# Patient Record
Sex: MALE | Employment: FULL TIME | URBAN - METROPOLITAN AREA
[De-identification: names, ages, dates, MRNs, and addresses within clinical notes are randomized per-mention and may not be internally consistent; named-entity substitution may affect disease eponyms.]

---

## 2017-05-20 ENCOUNTER — HOSPITAL ENCOUNTER (EMERGENCY)
Facility: CLINIC | Age: 52
Discharge: HOME | End: 2017-05-20
Attending: FAMILY MEDICINE | Admitting: FAMILY MEDICINE
Payer: COMMERCIAL

## 2017-05-20 VITALS
DIASTOLIC BLOOD PRESSURE: 84 MMHG | TEMPERATURE: 97.6 F | RESPIRATION RATE: 17 BRPM | BODY MASS INDEX: 25.38 KG/M2 | WEIGHT: 182 LBS | SYSTOLIC BLOOD PRESSURE: 156 MMHG | OXYGEN SATURATION: 97 %

## 2017-05-20 DIAGNOSIS — S61.219A LACERATION OF FINGER, INITIAL ENCOUNTER: ICD-10-CM

## 2017-05-20 PROCEDURE — 99213 OFFICE O/P EST LOW 20 MIN: CPT | Mod: 25

## 2017-05-20 PROCEDURE — 12002 RPR S/N/AX/GEN/TRNK2.6-7.5CM: CPT

## 2017-05-20 PROCEDURE — 12002 RPR S/N/AX/GEN/TRNK2.6-7.5CM: CPT | Performed by: FAMILY MEDICINE

## 2017-05-20 PROCEDURE — 99213 OFFICE O/P EST LOW 20 MIN: CPT | Mod: 25 | Performed by: FAMILY MEDICINE

## 2017-05-20 RX ORDER — LIDOCAINE HYDROCHLORIDE 10 MG/ML
INJECTION, SOLUTION INFILTRATION; PERINEURAL
Status: DISCONTINUED
Start: 2017-05-20 | End: 2017-05-20 | Stop reason: HOSPADM

## 2017-05-20 RX ORDER — CEPHALEXIN 500 MG/1
500 CAPSULE ORAL 2 TIMES DAILY
Qty: 14 CAPSULE | Refills: 0 | Status: SHIPPED | OUTPATIENT
Start: 2017-05-20 | End: 2017-05-27

## 2017-05-20 NOTE — ED PROVIDER NOTES
History     Chief Complaint   Patient presents with     Laceration     left hand 4th finger      HPI  Darrel Rabago is a 51 year old male who presents to urgent care with a left finger laceration that occurred this morning.   States that he was trying to open some aluminium cans when he accidentally cut his left ring finger.   Attempted to stop the bleeding at home with pressure and gauze and came in for further evaluation.   Bleeding had reduced at time of arrival. No pain reported.   Tetanus immunization is up to date.   Patient is right hand dominant.     I have reviewed the Medications, Allergies, Past Medical and Surgical History, and Social History in the Epic system.    Review of Systems   ROS: 10 point ROS neg other than the symptoms noted above in the HPI.    Physical Exam   BP: 156/84  Heart Rate: 49  Temp: 97.6  F (36.4  C)  Resp: 17  Weight: 82.6 kg (182 lb)  SpO2: 97 %  Physical Exam   Constitutional: He is oriented to person, place, and time. He appears well-developed and well-nourished.   Musculoskeletal: Normal range of motion. He exhibits no edema, tenderness or deformity.   Neurological: He is alert and oriented to person, place, and time.   Skin: Skin is warm.   Left fourth finger laceration measuring about 5 cm to include the finger tip and along the lateral aspect of the nail. No nail bed laceration.        ED Course     ED Course     Laceration repair  Date/Time: 5/20/2017 4:26 PM  Performed by: REYES RIDDLE  Authorized by: REYES RIDDLE   Consent: Verbal consent obtained.  Consent given by: patient  Patient understanding: patient states understanding of the procedure being performed  Patient consent: the patient's understanding of the procedure matches consent given  Patient identity confirmed: verbally with patient  Laceration length: 5 cm  Foreign bodies: no foreign bodies  Tendon involvement: none  Nerve involvement: none  Vascular damage: no    Anesthesia:  Local Anesthetic:  lidocaine 1% without epinephrine   Sedation:  Patient sedated: no    Irrigation solution: tap water  Irrigation method: tap  Amount of cleaning: standard  Debridement: none  Degree of undermining: none  Skin closure: 5-0 nylon and Steri-Strips  Number of sutures: 4  Technique: simple  Approximation: close  Approximation difficulty: simple  Dressing: antibiotic ointment  Patient tolerance: Patient tolerated the procedure well with no immediate complications                   Critical Care time:  none            Labs Ordered and Resulted from Time of ED Arrival Up to the Time of Departure from the ED - No data to display    Assessments & Plan (with Medical Decision Making)     I have reviewed the nursing notes.    I have reviewed the findings, diagnosis, plan and need for follow up with the patient.  51 year old male with Left fourth finger laceration.   Laceration repair, see procedure note above for details. Patient tolerated procedure well.    7 day course of antibiotics given. See prescription details below. Suture removal in 7 days.  Instructed to monitor for any signs of infection.   Patient education and Handout with home care instructions given     The patient was in agreement with the plan today and had no questions or concerns prior to leaving the Urgent care clinic.          Discharge Medication List as of 5/20/2017  4:13 PM      START taking these medications    Details   cephALEXin (KEFLEX) 500 MG capsule Take 1 capsule (500 mg) by mouth 2 times daily for 7 days, Disp-14 capsule, R-0, E-Prescribe             Final diagnoses:   Laceration of finger, initial encounter       5/20/2017   [facility] EMERGENCY DEPARTMENT     Mini Emery MD  05/20/17 3438

## 2017-05-20 NOTE — ED AVS SNAPSHOT
[facility] Emergency Department    [address]    [facility] [address]    Phone:  [phone]    Fax:  [phone]                                       Darrel Rabago   MRN: [id number]    Department:  [facility] Emergency Department   Date of Visit:  5/20/2017           Patient Information     Date Of Birth          1965        Your diagnoses for this visit were:     Laceration of finger, initial encounter        You were seen by Mini Emery MD.      Follow-up Information     Follow up with Wilber Dupree MD In 7 days.    Specialty:  Family Practice    Why:  For suture removal, As needed    Contact information:    [facility]  [address]  [facility] [address]  [phone]          Discharge Instructions         Extremity Laceration: Sutures, Staples, or Tape  A laceration is a cut through the skin. If it is deep, it may require stitches (sutures) or staples to close so it can heal. Minor cuts may be treated with surgical tape closures.   X-rays may be done if something may have entered the skin through the cut. You may also need a tetanus shot if you are not up to date on this vaccination.  Home care    Follow the health care provider s instructions on how to care for the cut.    Wash your hands with soap and warm water before and after caring for your wound. This is to help prevent infection.    Keep the wound clean and dry. If a bandage was applied and it becomes wet or dirty, replace it. Otherwise, leave it in place for the first 24 hours, then change it once a day or as directed.    If sutures or staples were used, clean the wound daily:    After removing the bandage, wash the area with soap and water. Use a wet cotton swab to loosen and remove any blood or crust that forms.    After cleaning, keep the wound clean and dry. Talk with your doctor before applying any antibiotic ointment to the wound. Reapply the bandage.    You may remove the bandage to shower as  usual after the first 24 hours, but do not soak the area in water (no swimming) until the stitches or staples are removed.    If surgical tape closures were used, keep the area clean and dry. If it becomes wet, blot it dry with a towel.    The doctor may prescribe an antibiotic cream or ointment to prevent infection. Do not stop taking this medication until you have finished the prescribed course or the doctor tells you to stop. The doctor may also prescribe medications for pain. Follow the doctor s instructions for taking these medications.    Avoid activities that may reopen your wound.  Follow-up care  Follow up with your health care provider. Most skin wounds heal within ten days. However, an infection may sometimes occur despite proper treatment. Therefore, check the wound daily for the signs of infection listed below. Stitches and staples should be removed within 7-14 days. If surgical tape closures were used, you may remove them after 10 days if they have not fallen off by then.   When to seek medical advice  Call your health care provider right away if any of these occur:    Wound bleeding not controlled by direct pressure    Signs of infection, including increasing pain in the wound, increasing wound redness or swelling, or pus or bad odor coming from the wound    Fever of 100.4 F (38 C) or higher or as directed by your healthcare provider    Stitches or staples come apart or fall out or surgical tape falls off before 7 days    Wound edges re-open    Wound changes colors    Numbness around the wound     Decreased movement around the injured area    1631-5250 [employer]. [address] [place] [address]. All rights reserved. This information is not intended as a substitute for professional medical care. Always follow your healthcare professional's instructions.          24 Hour Appointment Hotline       To make an appointment at any [facility], call [phone]  ([phone]). If you don't have a family doctor or clinic, we will help you find one. [facility] clinics are conveniently located to serve the needs of you and your family.             Review of your medicines      START taking        Dose / Directions Last dose taken    cephALEXin 500 MG capsule   Commonly known as:  KEFLEX   Dose:  500 mg   Quantity:  14 capsule        Take 1 capsule (500 mg) by mouth 2 times daily for 7 days   Refills:  0          Our records show that you are taking the medicines listed below. If these are incorrect, please call your family doctor or clinic.        Dose / Directions Last dose taken    NO ACTIVE MEDICATIONS        .   Refills:  0                Prescriptions were sent or printed at these locations (1 Prescription)                   [facility] [address]   [address] [facility] [address]    Telephone:  [phone]   Fax:  [phone]   Hours:                  E-Prescribed (1 of 1)         cephALEXin (KEFLEX) 500 MG capsule                Orders Needing Specimen Collection     None      Pending Results     No orders found from 5/18/2017 to 5/21/2017.            Pending Culture Results     No orders found from 5/18/2017 to 5/21/2017.            Pending Results Instructions     If you had any lab results that were not finalized at the time of your Discharge, you can call the ED Lab Result RN at [phone]. You will be contacted by this team for any positive Lab results or changes in treatment. The nurses are available 7 days a week from 10A to 6:30P.  You can leave a message 24 hours per day and they will return your call.        Test Results From Your Hospital Stay               Thank you for choosing [facility]       Thank you for choosing [facility] for your care. Our goal is always to provide you with excellent care. Hearing back from our patients is one way we can continue to improve our services. Please take a few minutes to  "complete the written survey that you may receive in the mail after you visit with us. Thank you!        [employer] Information     [employer] lets you send messages to your doctor, view your test results, renew your prescriptions, schedule appointments and more. To sign up, go to [email or web address] . Click on \"Log in\" on the left side of the screen, which will take you to the Welcome page. Then click on \"Sign up Now\" on the right side of the page.     You will be asked to enter the access code listed below, as well as some personal information. Please follow the directions to create your username and password.     Your access code is: [email or web address]  Expires: 2017  4:13 PM     Your access code will  in 90 days. If you need help or a new code, please call your [facility] clinic or [phone].        Care EveryWhere ID     This is your Care EveryWhere ID. This could be used by other organizations to access your [facility] medical records  [id number]        After Visit Summary       This is your record. Keep this with you and show to your community pharmacist(s) and doctor(s) at your next visit.                  "

## 2017-05-20 NOTE — DISCHARGE INSTRUCTIONS
Extremity Laceration: Sutures, Staples, or Tape  A laceration is a cut through the skin. If it is deep, it may require stitches (sutures) or staples to close so it can heal. Minor cuts may be treated with surgical tape closures.   X-rays may be done if something may have entered the skin through the cut. You may also need a tetanus shot if you are not up to date on this vaccination.  Home care    Follow the health care provider s instructions on how to care for the cut.    Wash your hands with soap and warm water before and after caring for your wound. This is to help prevent infection.    Keep the wound clean and dry. If a bandage was applied and it becomes wet or dirty, replace it. Otherwise, leave it in place for the first 24 hours, then change it once a day or as directed.    If sutures or staples were used, clean the wound daily:    After removing the bandage, wash the area with soap and water. Use a wet cotton swab to loosen and remove any blood or crust that forms.    After cleaning, keep the wound clean and dry. Talk with your doctor before applying any antibiotic ointment to the wound. Reapply the bandage.    You may remove the bandage to shower as usual after the first 24 hours, but do not soak the area in water (no swimming) until the stitches or staples are removed.    If surgical tape closures were used, keep the area clean and dry. If it becomes wet, blot it dry with a towel.    The doctor may prescribe an antibiotic cream or ointment to prevent infection. Do not stop taking this medication until you have finished the prescribed course or the doctor tells you to stop. The doctor may also prescribe medications for pain. Follow the doctor s instructions for taking these medications.    Avoid activities that may reopen your wound.  Follow-up care  Follow up with your health care provider. Most skin wounds heal within ten days. However, an infection may sometimes occur despite proper treatment.  Therefore, check the wound daily for the signs of infection listed below. Stitches and staples should be removed within 7-14 days. If surgical tape closures were used, you may remove them after 10 days if they have not fallen off by then.   When to seek medical advice  Call your health care provider right away if any of these occur:    Wound bleeding not controlled by direct pressure    Signs of infection, including increasing pain in the wound, increasing wound redness or swelling, or pus or bad odor coming from the wound    Fever of 100.4 F (38 C) or higher or as directed by your healthcare provider    Stitches or staples come apart or fall out or surgical tape falls off before 7 days    Wound edges re-open    Wound changes colors    Numbness around the wound     Decreased movement around the injured area    8571-9256 [employer]. [address] [place] [address], [place] [address]. All rights reserved. This information is not intended as a substitute for professional medical care. Always follow your healthcare professional's instructions.

## 2017-05-20 NOTE — ED AVS SNAPSHOT
[facility] Emergency Department    [address] [facility] [address]    Phone:  [phone]    Fax:  [phone]                                       Darrel Rabago   MRN: [id number]    Department:  [facility] Emergency Department   Date of Visit:  5/20/2017           After Visit Summary Signature Page     I have received my discharge instructions, and my questions have been answered. I have discussed any challenges I see with this plan with the nurse or doctor.    ..........................................................................................................................................  Patient/Patient Representative Signature      ..........................................................................................................................................  Patient Representative Print Name and Relationship to Patient    ..................................................               ................................................  Date                                            Time    ..........................................................................................................................................  Reviewed by Signature/Title    ...................................................              ..............................................  Date                                                            Time

## 2017-05-20 NOTE — ED NOTES
Pt reports cutting his left 4th finger on some aluminum this morning. Bleeding is controlled upon arrival, it is dressed with bulky gauze. Tetanus is up to date. Pt reports no pain currently.

## 2018-10-02 ENCOUNTER — APPOINTMENT (OUTPATIENT)
Dept: GENERAL RADIOLOGY | Facility: CLINIC | Age: 53
End: 2018-10-02
Attending: FAMILY MEDICINE
Payer: COMMERCIAL

## 2018-10-02 ENCOUNTER — HOSPITAL ENCOUNTER (EMERGENCY)
Facility: CLINIC | Age: 53
Discharge: HOME | End: 2018-10-02
Attending: FAMILY MEDICINE | Admitting: FAMILY MEDICINE
Payer: COMMERCIAL

## 2018-10-02 VITALS
HEART RATE: 66 BPM | BODY MASS INDEX: 26.78 KG/M2 | DIASTOLIC BLOOD PRESSURE: 89 MMHG | WEIGHT: 192 LBS | RESPIRATION RATE: 16 BRPM | SYSTOLIC BLOOD PRESSURE: 150 MMHG | OXYGEN SATURATION: 97 % | TEMPERATURE: 97.8 F

## 2018-10-02 DIAGNOSIS — M25.552 HIP PAIN, LEFT: ICD-10-CM

## 2018-10-02 DIAGNOSIS — M54.42 ACUTE LEFT-SIDED LOW BACK PAIN WITH LEFT-SIDED SCIATICA: ICD-10-CM

## 2018-10-02 PROCEDURE — 96374 THER/PROPH/DIAG INJ IV PUSH: CPT | Performed by: FAMILY MEDICINE

## 2018-10-02 PROCEDURE — 72100 X-RAY EXAM L-S SPINE 2/3 VWS: CPT

## 2018-10-02 PROCEDURE — 96375 TX/PRO/DX INJ NEW DRUG ADDON: CPT | Performed by: FAMILY MEDICINE

## 2018-10-02 PROCEDURE — 99285 EMERGENCY DEPT VISIT HI MDM: CPT | Mod: 25 | Performed by: FAMILY MEDICINE

## 2018-10-02 PROCEDURE — 99285 EMERGENCY DEPT VISIT HI MDM: CPT | Mod: Z6 | Performed by: FAMILY MEDICINE

## 2018-10-02 PROCEDURE — 73502 X-RAY EXAM HIP UNI 2-3 VIEWS: CPT

## 2018-10-02 PROCEDURE — 96361 HYDRATE IV INFUSION ADD-ON: CPT | Performed by: FAMILY MEDICINE

## 2018-10-02 PROCEDURE — 25000128 H RX IP 250 OP 636: Performed by: FAMILY MEDICINE

## 2018-10-02 RX ORDER — HYDROMORPHONE HYDROCHLORIDE 1 MG/ML
0.5 INJECTION, SOLUTION INTRAMUSCULAR; INTRAVENOUS; SUBCUTANEOUS
Status: DISCONTINUED | OUTPATIENT
Start: 2018-10-02 | End: 2018-10-02 | Stop reason: HOSPADM

## 2018-10-02 RX ORDER — DICLOFENAC SODIUM 75 MG/1
75 TABLET, DELAYED RELEASE ORAL 2 TIMES DAILY PRN
Qty: 30 TABLET | Refills: 0 | Status: SHIPPED | OUTPATIENT
Start: 2018-10-02 | End: 2019-06-06

## 2018-10-02 RX ORDER — DIAZEPAM 5 MG
5 TABLET ORAL EVERY 6 HOURS PRN
Qty: 20 TABLET | Refills: 0 | Status: SHIPPED | OUTPATIENT
Start: 2018-10-02 | End: 2018-10-09

## 2018-10-02 RX ORDER — OXYCODONE AND ACETAMINOPHEN 5; 325 MG/1; MG/1
1-2 TABLET ORAL EVERY 4 HOURS PRN
Qty: 12 TABLET | Refills: 0 | Status: SHIPPED | OUTPATIENT
Start: 2018-10-02 | End: 2018-10-10

## 2018-10-02 RX ORDER — KETOROLAC TROMETHAMINE 30 MG/ML
30 INJECTION, SOLUTION INTRAMUSCULAR; INTRAVENOUS ONCE
Status: COMPLETED | OUTPATIENT
Start: 2018-10-02 | End: 2018-10-02

## 2018-10-02 RX ADMIN — KETOROLAC TROMETHAMINE 30 MG: 30 INJECTION, SOLUTION INTRAMUSCULAR at 05:59

## 2018-10-02 RX ADMIN — SODIUM CHLORIDE 1000 ML: 9 INJECTION, SOLUTION INTRAVENOUS at 06:03

## 2018-10-02 RX ADMIN — Medication 0.5 MG: at 06:01

## 2018-10-02 NOTE — ED AVS SNAPSHOT
[facility] Emergency Department    [address] [address]    Phone:  [phone]    Fax:  [phone]                                       Darrel Rabago   MRN: [id number]    Department:  [facility] Emergency Department   Date of Visit:  10/2/2018           After Visit Summary Signature Page     I have received my discharge instructions, and my questions have been answered. I have discussed any challenges I see with this plan with the nurse or doctor.    ..........................................................................................................................................  Patient/Patient Representative Signature      ..........................................................................................................................................  Patient Representative Print Name and Relationship to Patient    ..................................................               ................................................  Date                                   Time    ..........................................................................................................................................  Reviewed by Signature/Title    ...................................................              ..............................................  Date                                               Time          22EPIC Rev 08/18
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
## 2018-10-02 NOTE — DISCHARGE INSTRUCTIONS
Diclofenac 75 mg 2 times daily with food.  Percocet for breakthrough pain.  Valium may be used as directed for spasm.  Warm pack and gentle massage to the spastic areas.  Physical therapy referral has been placed and will simply need to call for an appointment.  Appointment with sports medicine in the next 7-10 days Dr. Karl Najera for further follow-up and direction of evaluation further as needed.
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
## 2018-10-03 ENCOUNTER — OFFICE VISIT (OUTPATIENT)
Dept: ORTHOPEDICS | Facility: CLINIC | Age: 53
End: 2018-10-03
Payer: COMMERCIAL

## 2018-10-03 VITALS
BODY MASS INDEX: 27.72 KG/M2 | SYSTOLIC BLOOD PRESSURE: 170 MMHG | WEIGHT: 198 LBS | DIASTOLIC BLOOD PRESSURE: 101 MMHG | HEIGHT: 71 IN

## 2018-10-03 DIAGNOSIS — G57.12 MERALGIA PARESTHETICA, LEFT LOWER LIMB: Primary | ICD-10-CM

## 2018-10-03 PROCEDURE — 76942 ECHO GUIDE FOR BIOPSY: CPT | Performed by: FAMILY MEDICINE

## 2018-10-03 PROCEDURE — 64450 NJX AA&/STRD OTHER PN/BRANCH: CPT | Performed by: FAMILY MEDICINE

## 2018-10-03 PROCEDURE — 99203 OFFICE O/P NEW LOW 30 MIN: CPT | Mod: 25 | Performed by: FAMILY MEDICINE

## 2018-10-03 RX ORDER — TRIAMCINOLONE ACETONIDE 40 MG/ML
40 INJECTION, SUSPENSION INTRA-ARTICULAR; INTRAMUSCULAR ONCE
Qty: 1 ML | Refills: 0 | OUTPATIENT
Start: 2018-10-03 | End: 2018-10-03
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
## 2018-10-03 NOTE — MR AVS SNAPSHOT
After Visit Summary   10/3/2018    Darrel Rabago    MRN: [id number]           Patient Information     Date Of Birth          1965        Visit Information        Provider Department      10/3/2018 3:00 PM Karl Najera DO [facility]        Today's Diagnoses     Meralgia paresthetica, left lower limb    -  1      Care Instructions    Patient to follow up with Primary Care provider regarding elevated blood pressure.          Follow-ups after your visit        Future tests that were ordered for you today     Open Future Orders        Priority Expected Expires Ordered    PHYSICAL THERAPY REFERRAL Routine  10/2/2019 10/2/2018            Who to contact     If you have questions or need follow up information about today's clinic visit or your schedule please contact [facility] directly at [phone].  Normal or non-critical lab and imaging results will be communicated to you by Humansizedhart, letter or phone within 4 business days after the clinic has received the results. If you do not hear from us within 7 days, please contact the clinic through Humansizedhart or phone. If you have a critical or abnormal lab result, we will notify you by phone as soon as possible.  Submit refill requests through Voxeet or call your pharmacy and they will forward the refill request to us. Please allow 3 business days for your refill to be completed.          Additional Information About Your Visit        MyChart Information     Voxeet gives you secure access to your electronic health record. If you see a primary care provider, you can also send messages to your care team and make appointments. If you have questions, please call your primary care clinic.  If you do not have a primary care provider, please call [phone] and they will assist you.        Care EveryWhere ID     This is your Care EveryWhere ID. This could be used by other organizations to  "access your [facility] medical records  [id number]        Your Vitals Were     Height BMI (Body Mass Index)                5' 11\" (1.803 m) 27.62 kg/m2           Blood Pressure from Last 3 Encounters:   10/03/18 (!) 170/101   10/02/18 150/89   05/20/17 156/84    Weight from Last 3 Encounters:   10/03/18 198 lb (89.8 kg)   10/02/18 192 lb (87.1 kg)   05/20/17 182 lb (82.6 kg)              We Performed the Following     INJECTION NERVE BLOCK, OTHER PERIPHERAL     TRIAMCINOLONE ACET INJ NOS     US GUIDE FOR NEEDLE PLACEMENT          Today's Medication Changes          These changes are accurate as of 10/3/18  7:43 PM.  If you have any questions, ask your nurse or doctor.               Start taking these medicines.        Dose/Directions    triamcinolone acetonide 40 MG/ML injection   Commonly known as:  KENALOG-40   Used for:  Meralgia paresthetica, left lower limb   Started by:  Karl Najera DO        Dose:  40 mg   1 mL (40 mg) by INTRA-ARTICULAR route once for 1 dose   Quantity:  1 mL   Refills:  0            Where to get your medicines      Some of these will need a paper prescription and others can be bought over the counter.  Ask your nurse if you have questions.     You don't need a prescription for these medications     triamcinolone acetonide 40 MG/ML injection                Primary Care Provider Office Phone # Fax #    Wilber Dupree [phone] [address] [facility] [address]        Equal Access to Services     JAQUAN THOMAS AH: Hadii aad ku hadasho Soomaali, waaxda luqadaha, qaybta kaalmada adeegyada, waxorion idiin hayaan adecollette estrada. So [facility] [phone].    ATENCIÓN: Si habla español, tiene a chan disposición servicios gratuitos de asistencia lingüística. Llame al [phone].    We comply with applicable federal civil rights laws and [place] laws. We do not discriminate on the basis of race, color, national origin, age, disability, sex, sexual " orientation, or gender identity.            Thank you!     Thank you for choosing [facility]  for your care. Our goal is always to provide you with excellent care. Hearing back from our patients is one way we can continue to improve our services. Please take a few minutes to complete the written survey that you may receive in the mail after your visit with us. Thank you!             Your Updated Medication List - Protect others around you: Learn how to safely use, store and throw away your medicines at www.disposemymeds.org.          This list is accurate as of 10/3/18  7:43 PM.  Always use your most recent med list.                   Brand Name Dispense Instructions for use Diagnosis    diazepam 5 MG tablet    VALIUM    20 tablet    Take 1 tablet (5 mg) by mouth every 6 hours as needed for muscle spasms        diclofenac 75 MG EC tablet    VOLTAREN    30 tablet    Take 1 tablet (75 mg) by mouth 2 times daily as needed for moderate pain        NO ACTIVE MEDICATIONS      .        oxyCODONE-acetaminophen 5-325 MG per tablet    PERCOCET    12 tablet    Take 1-2 tablets by mouth every 4 hours as needed for pain        triamcinolone acetonide 40 MG/ML injection    KENALOG-40    1 mL    1 mL (40 mg) by INTRA-ARTICULAR route once for 1 dose    Meralgia paresthetica, left lower limb
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
## 2018-10-10 ENCOUNTER — OFFICE VISIT (OUTPATIENT)
Dept: ORTHOPEDICS | Facility: CLINIC | Age: 53
End: 2018-10-10
Payer: COMMERCIAL

## 2018-10-10 VITALS
DIASTOLIC BLOOD PRESSURE: 95 MMHG | BODY MASS INDEX: 27.72 KG/M2 | WEIGHT: 198 LBS | HEIGHT: 71 IN | SYSTOLIC BLOOD PRESSURE: 173 MMHG

## 2018-10-10 DIAGNOSIS — M25.552 LEFT HIP PAIN: Primary | ICD-10-CM

## 2018-10-10 DIAGNOSIS — G57.12 MERALGIA PARESTHETICA, LEFT LOWER LIMB: ICD-10-CM

## 2018-10-10 PROCEDURE — 20611 DRAIN/INJ JOINT/BURSA W/US: CPT | Mod: LT | Performed by: FAMILY MEDICINE

## 2018-10-10 PROCEDURE — 99213 OFFICE O/P EST LOW 20 MIN: CPT | Mod: 25 | Performed by: FAMILY MEDICINE

## 2018-10-10 RX ORDER — OXYCODONE AND ACETAMINOPHEN 5; 325 MG/1; MG/1
1 TABLET ORAL EVERY 4 HOURS PRN
Qty: 15 TABLET | Refills: 0 | Status: SHIPPED | OUTPATIENT
Start: 2018-10-10 | End: 2019-06-06

## 2018-10-10 RX ORDER — DIAZEPAM 5 MG
5 TABLET ORAL EVERY 12 HOURS PRN
Qty: 20 TABLET | Refills: 0 | Status: SHIPPED | OUTPATIENT
Start: 2018-10-10 | End: 2018-10-23

## 2018-10-10 RX ADMIN — ROPIVACAINE HYDROCHLORIDE 3 ML: 5 INJECTION, SOLUTION EPIDURAL; INFILTRATION; PERINEURAL at 19:15

## 2018-10-10 RX ADMIN — TRIAMCINOLONE ACETONIDE 40 MG: 40 INJECTION, SUSPENSION INTRA-ARTICULAR; INTRAMUSCULAR at 19:15
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
## 2018-10-10 NOTE — MR AVS SNAPSHOT
After Visit Summary   10/10/2018    Darrel Rabago    MRN: [id number]           Patient Information     Date Of Birth          1965        Visit Information        Provider Department      10/10/2018 6:20 PM Karl Najera DO [facility]        Today's Diagnoses     Left hip pain    -  1    Meralgia paresthetica, left lower limb          Care Instructions    Patient to follow up with Primary Care provider regarding elevated blood pressure.          Follow-ups after your visit        Your next 10 appointments already scheduled     Oct 18, 2018  7:00 AM CDT   Ortho Treatment with Gibson Vasquez, PT   [facility] ([facility])    [address]  [address]  [facility] [address]   [phone]            Oct 25, 2018  7:30 AM CDT   Ortho Treatment with Gibson Vasquez PT   [facility] ([facility])    [address]  [address]  [facility] [address]   [phone]              Who to contact     If you have questions or need follow up information about today's clinic visit or your schedule please contact [facility] directly at [phone].  Normal or non-critical lab and imaging results will be communicated to you by MyChart, letter or phone within 4 business days after the clinic has received the results. If you do not hear from us within 7 days, please contact the clinic through MyChart or phone. If you have a critical or abnormal lab result, we will notify you by phone as soon as possible.  Submit refill requests through [facility] or call your pharmacy and they will forward the refill request to us. Please allow 3 business days for your refill to be completed.          Additional Information About Your Visit        [email or web address] Information     [facility] gives you secure access to your electronic health record. If you see a primary care provider, you can  "also send messages to your care team and make appointments. If you have questions, please call your primary care clinic.  If you do not have a primary care provider, please call [phone] and they will assist you.        Care EveryWhere ID     This is your Care EveryWhere ID. This could be used by other organizations to access your [place] medical records  [id number]        Your Vitals Were     Height BMI (Body Mass Index)                5' 11\" (1.803 m) 27.62 kg/m2           Blood Pressure from Last 3 Encounters:   10/10/18 (!) 173/95   10/03/18 (!) 170/101   10/02/18 150/89    Weight from Last 3 Encounters:   10/10/18 198 lb (89.8 kg)   10/03/18 198 lb (89.8 kg)   10/02/18 192 lb (87.1 kg)              We Performed the Following     Large Joint Injection/Arthocentesis          Today's Medication Changes          These changes are accurate as of 10/10/18 11:59 PM.  If you have any questions, ask your nurse or doctor.               Start taking these medicines.        Dose/Directions    diazepam 5 MG tablet   Commonly known as:  VALIUM   Used for:  Left hip pain   Started by:  Karl Najera DO        Dose:  5 mg   Take 1 tablet (5 mg) by mouth every 12 hours as needed for muscle spasms or sleep   Quantity:  20 tablet   Refills:  0         These medicines have changed or have updated prescriptions.        Dose/Directions    oxyCODONE-acetaminophen 5-325 MG per tablet   Commonly known as:  PERCOCET   This may have changed:  how much to take   Used for:  Left hip pain   Changed by:  Karl Najera DO        Dose:  1 tablet   Take 1 tablet by mouth every 4 hours as needed for pain   Quantity:  15 tablet   Refills:  0            Where to get your medicines      Some of these will need a paper prescription and others can be bought over the counter.  Ask your nurse if you have questions.     Bring a paper prescription for each of these medications     diazepam 5 MG tablet    " oxyCODONE-acetaminophen 5-325 MG per tablet               Information about OPIOIDS     PRESCRIPTION OPIOIDS: WHAT YOU NEED TO KNOW   We gave you an opioid (narcotic) pain medicine. It is important to manage your pain, but opioids are not always the best choice. You should first try all the other options your care team gave you. Take this medicine for as short a time (and as few doses) as possible.    Some activities can increase your pain, such as bandage changes or therapy sessions. It may help to take your pain medicine 30 to 60 minutes before these activities. Reduce your stress by getting enough sleep, working on hobbies you enjoy and practicing relaxation or meditation. Talk to your care team about ways to manage your pain beyond prescription opioids.    These medicines have risks:    DO NOT drive when on new or higher doses of pain medicine. These medicines can affect your alertness and reaction times, and you could be arrested for driving under the influence (DUI). If you need to use opioids long-term, talk to your care team about driving.    DO NOT operate heavy machinery    DO NOT do any other dangerous activities while taking these medicines.    DO NOT drink any alcohol while taking these medicines.     If the opioid prescribed includes acetaminophen, DO NOT take with any other medicines that contain acetaminophen. Read all labels carefully. Look for the word  acetaminophen  or  Tylenol.  Ask your pharmacist if you have questions or are unsure.    You can get addicted to pain medicines, especially if you have a history of addiction (chemical, alcohol or substance dependence). Talk to your care team about ways to reduce this risk.    All opioids tend to cause constipation. Drink plenty of water and eat foods that have a lot of fiber, such as fruits, vegetables, prune juice, apple juice and high-fiber cereal. Take a laxative (Miralax, milk of magnesia, Colace, Senna) if you don t move your bowels at least  every other day. Other side effects include upset stomach, sleepiness, dizziness, throwing up, tolerance (needing more of the medicine to have the same effect), physical dependence and slowed breathing.    Store your pills in a secure place, locked if possible. We will not replace any lost or stolen medicine. If you don t finish your medicine, please throw away (dispose) as directed by your pharmacist. The [place] Pollution Control Agency has more information about safe disposal: [email or web address]         Primary Care Provider Office Phone # Fax #    Wilber Dupree [phone] [address] [facility] [address]        Equal Access to Services     SILVESTRE THOMAS : John Anders, wafelipe hernandez, qamaryann kaalmadylan cotton, claudia colunga . So [facility] [phone].    ATENCIÓN: Si habla español, tiene a chan disposición servicios gratuitos de asistencia lingüística. Llame al [phone].    We comply with applicable federal civil rights laws and [place] laws. We do not discriminate on the basis of race, color, national origin, age, disability, sex, sexual orientation, or gender identity.            Thank you!     Thank you for choosing [facility]  for your care. Our goal is always to provide you with excellent care. Hearing back from our patients is one way we can continue to improve our services. Please take a few minutes to complete the written survey that you may receive in the mail after your visit with us. Thank you!             Your Updated Medication List - Protect others around you: Learn how to safely use, store and throw away your medicines at www.disposemymeds.org.          This list is accurate as of 10/10/18 11:59 PM.  Always use your most recent med list.                   Brand Name Dispense Instructions for use Diagnosis    diazepam 5 MG tablet    VALIUM     20 tablet    Take 1 tablet (5 mg) by mouth every 12 hours as needed for muscle spasms or sleep    Left hip pain       diclofenac 75 MG EC tablet    VOLTAREN    30 tablet    Take 1 tablet (75 mg) by mouth 2 times daily as needed for moderate pain        NO ACTIVE MEDICATIONS      .        oxyCODONE-acetaminophen 5-325 MG per tablet    PERCOCET    15 tablet    Take 1 tablet by mouth every 4 hours as needed for pain    Left hip pain

## 2018-10-11 ENCOUNTER — HOSPITAL ENCOUNTER (OUTPATIENT)
Dept: PHYSICAL THERAPY | Facility: CLINIC | Age: 53
Setting detail: THERAPIES SERIES
End: 2018-10-11
Attending: FAMILY MEDICINE
Payer: COMMERCIAL

## 2018-10-11 DIAGNOSIS — G57.12 MERALGIA PARESTHETICA, LEFT LOWER LIMB: ICD-10-CM

## 2018-10-11 PROCEDURE — 40000718 ZZHC STATISTIC PT DEPARTMENT ORTHO VISIT: Performed by: PHYSICAL THERAPIST

## 2018-10-11 PROCEDURE — 97161 PT EVAL LOW COMPLEX 20 MIN: CPT | Mod: GP | Performed by: PHYSICAL THERAPIST

## 2018-10-11 PROCEDURE — 97140 MANUAL THERAPY 1/> REGIONS: CPT | Mod: GP | Performed by: PHYSICAL THERAPIST

## 2018-10-11 PROCEDURE — 97110 THERAPEUTIC EXERCISES: CPT | Mod: GP | Performed by: PHYSICAL THERAPIST

## 2018-10-12 RX ORDER — ROPIVACAINE HYDROCHLORIDE 5 MG/ML
3 INJECTION, SOLUTION EPIDURAL; INFILTRATION; PERINEURAL
Status: DISCONTINUED | OUTPATIENT
Start: 2018-10-10 | End: 2019-06-06

## 2018-10-12 RX ORDER — TRIAMCINOLONE ACETONIDE 40 MG/ML
40 INJECTION, SUSPENSION INTRA-ARTICULAR; INTRAMUSCULAR
Status: DISCONTINUED | OUTPATIENT
Start: 2018-10-10 | End: 2019-06-06
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
## 2018-10-15 ENCOUNTER — MYC MEDICAL ADVICE (OUTPATIENT)
Dept: PHYSICAL THERAPY | Facility: CLINIC | Age: 53
End: 2018-10-15

## 2018-10-15 DIAGNOSIS — M54.40 CHRONIC BILATERAL LOW BACK PAIN WITH SCIATICA, SCIATICA LATERALITY UNSPECIFIED: ICD-10-CM

## 2018-10-15 DIAGNOSIS — G57.12 MERALGIA PARESTHETICA, LEFT LOWER LIMB: Primary | ICD-10-CM

## 2018-10-15 DIAGNOSIS — M79.605 PAIN OF LEFT LOWER EXTREMITY: ICD-10-CM

## 2018-10-15 DIAGNOSIS — G89.29 CHRONIC BILATERAL LOW BACK PAIN WITH SCIATICA, SCIATICA LATERALITY UNSPECIFIED: ICD-10-CM

## 2018-10-17 RX ORDER — PREDNISONE 20 MG/1
40 TABLET ORAL DAILY
Qty: 10 TABLET | Refills: 0 | Status: SHIPPED | OUTPATIENT
Start: 2018-10-17 | End: 2018-10-22

## 2018-10-17 NOTE — ADDENDUM NOTE
Encounter addended by: Gibson Vasquez, PT on: 10/17/2018  8:13 AM<BR>     Actions taken: Flowsheet accepted, Charge Capture section accepted

## 2018-10-17 NOTE — TELEPHONE ENCOUNTER
Spoke with patient, will try prednisone, continue PT, image of lumbar spine.  EMG also a consideration but given acuity suspect deficit may not yet show up on EMG.  All questions answered.

## 2018-10-18 ENCOUNTER — HOSPITAL ENCOUNTER (OUTPATIENT)
Dept: MRI IMAGING | Facility: CLINIC | Age: 53
Discharge: HOME | End: 2018-10-18
Attending: FAMILY MEDICINE | Admitting: FAMILY MEDICINE
Payer: COMMERCIAL

## 2018-10-18 ENCOUNTER — HOSPITAL ENCOUNTER (OUTPATIENT)
Dept: PHYSICAL THERAPY | Facility: CLINIC | Age: 53
Setting detail: THERAPIES SERIES
End: 2018-10-18
Attending: FAMILY MEDICINE
Payer: COMMERCIAL

## 2018-10-18 DIAGNOSIS — G57.12 MERALGIA PARESTHETICA, LEFT LOWER LIMB: ICD-10-CM

## 2018-10-18 PROCEDURE — 97140 MANUAL THERAPY 1/> REGIONS: CPT | Mod: GP | Performed by: PHYSICAL THERAPIST

## 2018-10-18 PROCEDURE — 72148 MRI LUMBAR SPINE W/O DYE: CPT

## 2018-10-18 PROCEDURE — 40000718 ZZHC STATISTIC PT DEPARTMENT ORTHO VISIT: Performed by: PHYSICAL THERAPIST

## 2018-10-23 ENCOUNTER — TELEPHONE (OUTPATIENT)
Dept: ORTHOPEDICS | Facility: CLINIC | Age: 53
End: 2018-10-23

## 2018-10-23 ENCOUNTER — TELEPHONE (OUTPATIENT)
Dept: PALLIATIVE MEDICINE | Facility: CLINIC | Age: 53
End: 2018-10-23

## 2018-10-23 DIAGNOSIS — M25.552 LEFT HIP PAIN: ICD-10-CM

## 2018-10-23 DIAGNOSIS — M54.16 LUMBAR RADICULOPATHY, ACUTE: Primary | ICD-10-CM

## 2018-10-23 RX ORDER — DIAZEPAM 5 MG
5 TABLET ORAL EVERY 12 HOURS PRN
Qty: 30 TABLET | Refills: 0 | Status: SHIPPED | OUTPATIENT
Start: 2018-10-23 | End: 2019-06-06

## 2018-10-23 NOTE — TELEPHONE ENCOUNTER
Spoke with Darrel, discussed MR results.  Will order Pain mgmt referral for TFESI at L3-4 and L4-5, reviewed with Dr Rosa Isela Cornelius, who agreed, and will allow him to be scheduled in 1045am urgent spot on 11/1/2018.  Will refill valium also for muscle relaxer and sleep, he has stopped taking percocet, which is good.  Continue PT as scheduled, discussed with PT as well.

## 2018-10-23 NOTE — TELEPHONE ENCOUNTER
Pt is returning call from Dr. Najera    Please contact pt @:  [phone]    Denise Behrendt  Specialty CSS
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
## 2018-10-24 ENCOUNTER — OFFICE VISIT (OUTPATIENT)
Dept: FAMILY MEDICINE | Facility: CLINIC | Age: 53
End: 2018-10-24
Payer: COMMERCIAL

## 2018-10-24 VITALS
TEMPERATURE: 98 F | HEART RATE: 78 BPM | SYSTOLIC BLOOD PRESSURE: 156 MMHG | BODY MASS INDEX: 26.33 KG/M2 | HEIGHT: 71 IN | WEIGHT: 188.1 LBS | DIASTOLIC BLOOD PRESSURE: 102 MMHG

## 2018-10-24 DIAGNOSIS — M54.42 ACUTE BILATERAL LOW BACK PAIN WITH LEFT-SIDED SCIATICA: ICD-10-CM

## 2018-10-24 DIAGNOSIS — Z12.5 SPECIAL SCREENING FOR MALIGNANT NEOPLASM OF PROSTATE: ICD-10-CM

## 2018-10-24 DIAGNOSIS — I10 BENIGN ESSENTIAL HYPERTENSION: ICD-10-CM

## 2018-10-24 DIAGNOSIS — Z00.00 PREVENTATIVE HEALTH CARE: ICD-10-CM

## 2018-10-24 DIAGNOSIS — Z12.11 SPECIAL SCREENING FOR MALIGNANT NEOPLASMS, COLON: Primary | ICD-10-CM

## 2018-10-24 PROCEDURE — 99213 OFFICE O/P EST LOW 20 MIN: CPT | Mod: 25 | Performed by: FAMILY MEDICINE

## 2018-10-24 PROCEDURE — 99396 PREV VISIT EST AGE 40-64: CPT | Performed by: FAMILY MEDICINE

## 2018-10-24 RX ORDER — AMLODIPINE BESYLATE 2.5 MG/1
2.5 TABLET ORAL DAILY
Qty: 30 TABLET | Refills: 1 | Status: SHIPPED | OUTPATIENT
Start: 2018-10-24 | End: 2018-11-21

## 2018-10-24 RX ORDER — GABAPENTIN 100 MG/1
100 CAPSULE ORAL 3 TIMES DAILY
Qty: 90 CAPSULE | Refills: 0 | Status: SHIPPED | OUTPATIENT
Start: 2018-10-24 | End: 2019-06-06

## 2018-10-24 NOTE — MR AVS SNAPSHOT
After Visit Summary   10/24/2018    Darrel Rabago    MRN: [id number]           Patient Information     Date Of Birth          1965        Visit Information        Provider Department      10/24/2018 4:00 PM Wilber Dupree MD [facility]        Today's Diagnoses     Special screening for malignant neoplasms, colon    -  1    Preventative health care        Acute bilateral low back pain with left-sided sciatica        Benign essential hypertension        Special screening for malignant neoplasm of prostate          Care Instructions      Preventive Health Recommendations  Male Ages 50 - 64    Yearly exam:             See your health care provider every year in order to  o   Review health changes.   o   Discuss preventive care.    o   Review your medicines if your doctor has prescribed any.     Have a cholesterol test every 5 years, or more frequently if you are at risk for high cholesterol/heart disease.     Have a diabetes test (fasting glucose) every three years. If you are at risk for diabetes, you should have this test more often.     Have a colonoscopy at age 50, or have a yearly FIT test (stool test). These exams will check for colon cancer.      Talk with your health care provider about whether or not a prostate cancer screening test (PSA) is right for you.    You should be tested each year for STDs (sexually transmitted diseases), if you re at risk.     Shots: Get a flu shot each year. Get a tetanus shot every 10 years.     Nutrition:    Eat at least 5 servings of fruits and vegetables daily.     Eat whole-grain bread, whole-wheat pasta and brown rice instead of white grains and rice.     Get adequate Calcium and Vitamin D.     Lifestyle    Exercise for at least 150 minutes a week (30 minutes a day, 5 days a week). This will help you control your weight and prevent disease.     Limit alcohol to one drink per day.     No smoking.     Wear sunscreen to prevent skin cancer.      See your dentist every six months for an exam and cleaning.     See your eye doctor every 1 to 2 years.            Follow-ups after your visit        Additional Services     GASTROENTEROLOGY ADULT REF PROCEDURE ONLY [facility] [phone]       Last Lab Result: No results found for: CR  Body mass index is 26.42 kg/(m^2).     Needed:  No  Language:  English    Patient will be contacted to schedule procedure.     Please be aware that coverage of these services is subject to the terms and limitations of your health insurance plan.  Call member services at your health plan with any benefit or coverage questions.  Any procedures must be performed at a [facility] facility OR coordinated by your clinic's referral office.    Please bring the following with you to your appointment:    (1) Any X-Rays, CTs or MRIs which have been performed.  Contact the facility where they were done to arrange for  prior to your scheduled appointment.    (2) List of current medications   (3) This referral request   (4) Any documents/labs given to you for this referral                  Your next 10 appointments already scheduled     Oct 25, 2018  7:30 AM CDT   Ortho Treatment with Gibson Vasquez PT   [facility] ([facility])    [address]  [address]  [place] [address]   [phone]            Nov 01, 2018 10:45 AM CDT   Radiology Injections with Yakov Cornelius MD   [facility] ([facility] [place])    [address] [facility] [address]  [address]  [place] [address]   [phone]              Who to contact     Normal or non-critical lab and imaging results will be communicated to you by MyChart, letter or phone within 4 business days after the clinic has received the results. If you do not hear from us within 7 days, please contact the clinic through MyChart or phone. If you have a critical or abnormal lab result, we  "will notify you by phone as soon as possible.  Submit refill requests through [employer] or call your pharmacy and they will forward the refill request to us. Please allow 3 business days for your refill to be completed.          If you need to speak with a  for additional information , please call: [phone]             Additional Information About Your Visit        [employer] Information     [employer] gives you secure access to your electronic health record. If you see a primary care provider, you can also send messages to your care team and make appointments. If you have questions, please call your primary care clinic.  If you do not have a primary care provider, please call [phone] and they will assist you.        Care EveryWhere ID     This is your Care EveryWhere ID. This could be used by other organizations to access your [facility] medical records  [id number]        Your Vitals Were     Pulse Temperature Height BMI (Body Mass Index)          78 98  F (36.7  C) (Tympanic) 5' 10.75\" (1.797 m) 26.42 kg/m2         Blood Pressure from Last 3 Encounters:   10/24/18 (!) 156/102   10/10/18 (!) 173/95   10/03/18 (!) 170/101    Weight from Last 3 Encounters:   10/24/18 188 lb 1.6 oz (85.3 kg)   10/10/18 198 lb (89.8 kg)   10/03/18 198 lb (89.8 kg)              We Performed the Following     GASTROENTEROLOGY ADULT REF PROCEDURE ONLY [facility] [phone]     GLUCOSE     PSA, screen          Today's Medication Changes          These changes are accurate as of 10/24/18  4:45 PM.  If you have any questions, ask your nurse or doctor.               Start taking these medicines.        Dose/Directions    amLODIPine 2.5 MG tablet   Commonly known as:  NORVASC   Used for:  Benign essential hypertension   Started by:  Wilber Dupree MD        Dose:  2.5 mg   Take 1 tablet (2.5 mg) by mouth daily   Quantity:  30 tablet   Refills:  1       gabapentin 100 MG capsule   Commonly known as:  NEURONTIN   Used " for:  Acute bilateral low back pain with left-sided sciatica   Started by:  Wilber Dupree MD        Dose:  100 mg   Take 1 capsule (100 mg) by mouth 3 times daily   Quantity:  90 capsule   Refills:  0         Stop taking these medicines if you haven't already. Please contact your care team if you have questions.     NO ACTIVE MEDICATIONS   Stopped by:  Wilber Dupree MD                Where to get your medicines      These medications were sent to [facility] [place], [place] - [address]  [address] [address]     Phone:  [phone]     amLODIPine 2.5 MG tablet    gabapentin 100 MG capsule                Primary Care Provider Office Phone # Fax #    Wilber Dupree [phone] [address] [facility] [address]        Equal Access to Services     SILVESTRE THOMAS AH: Hadii pavan dunne hadasho Soomaali, waaxda luqadaha, qaybta kaalmada adeegyada, claudia colunga . So [facility] [phone].    ATENCIÓN: Si habla español, tiene a chan disposición servicios gratuitos de asistencia lingüística. Llame al [phone].    We comply with applicable federal civil rights laws and [place] laws. We do not discriminate on the basis of race, color, national origin, age, disability, sex, sexual orientation, or gender identity.            Thank you!     Thank you for choosing [facility]  for your care. Our goal is always to provide you with excellent care. Hearing back from our patients is one way we can continue to improve our services. Please take a few minutes to complete the written survey that you may receive in the mail after your visit with us. Thank you!             Your Updated Medication List - Protect others around you: Learn how to safely use, store and throw away your medicines at www.disposemymeds.org.          This list is accurate as of 10/24/18  4:45 PM.  Always use your most recent med list.                   Brand Name  Dispense Instructions for use Diagnosis    amLODIPine 2.5 MG tablet    NORVASC    30 tablet    Take 1 tablet (2.5 mg) by mouth daily    Benign essential hypertension       diazepam 5 MG tablet    VALIUM    30 tablet    Take 1 tablet (5 mg) by mouth every 12 hours as needed for muscle spasms or sleep    Left hip pain       diclofenac 75 MG EC tablet    VOLTAREN    30 tablet    Take 1 tablet (75 mg) by mouth 2 times daily as needed for moderate pain        gabapentin 100 MG capsule    NEURONTIN    90 capsule    Take 1 capsule (100 mg) by mouth 3 times daily    Acute bilateral low back pain with left-sided sciatica       oxyCODONE-acetaminophen 5-325 MG per tablet    PERCOCET    15 tablet    Take 1 tablet by mouth every 4 hours as needed for pain    Left hip pain

## 2018-10-24 NOTE — PROGRESS NOTES
SUBJECTIVE:   CC: Darrel Rabago is an 52 year old male who presents for preventative health visit.     Healthy Habits:    Do you get at least three servings of calcium containing foods daily (dairy, green leafy vegetables, etc.)? yes    Amount of exercise or daily activities, outside of work: minimal during the past month due to pinched nerves    Problems taking medications regularly No    Medication side effects: Yes Valium -anxiety, fatigue    Have you had an eye exam in the past two years? yes    Do you see a dentist twice per year? no    Do you have sleep apnea, excessive snoring or daytime drowsiness?yes       *Pinched nerve left leg/knee  *wife would like to address snoring and blood pressure  Today's PHQ-2 Score:   PHQ-2 ( 1999 [employer]) 10/24/2018 6/3/2015   Q1: Little interest or pleasure in doing things 1 0   Q2: Feeling down, depressed or hopeless 1 0   PHQ-2 Score 2 0       Abuse: Current or Past(Physical, Sexual or Emotional)- No  Do you feel safe in your environment - Yes    Social History   Substance Use Topics     Smoking status: Never Smoker     Smokeless tobacco: Never Used     Alcohol use Yes      Comment: 10-12 drinks per week      If you drink alcohol do you typically have >3 drinks per day or >7 drinks per week? No                      Last PSA: No results found for: PSA    Reviewed orders with patient. Reviewed health maintenance and updated orders accordingly - Yes      Reviewed and updated as needed this visit by clinical staff  Tobacco  Allergies  Meds  Med Hx  Surg Hx  Fam Hx  Soc Hx        Reviewed and updated as needed this visit by Provider            ROS:  CONSTITUTIONAL: NEGATIVE for fever, chills, change in weight  INTEGUMENTARY/SKIN: NEGATIVE for worrisome rashes, moles or lesions  EYES: NEGATIVE for vision changes or irritation  ENT: NEGATIVE for ear, mouth and throat problems  RESP: NEGATIVE for significant cough or SOB  CV: NEGATIVE for chest pain, palpitations or  "peripheral edema  GI: NEGATIVE for nausea, abdominal pain, heartburn, or change in bowel habits   male: negative for dysuria, hematuria, decreased urinary stream, erectile dysfunction, urethral discharge  MUSCULOSKELETAL: NEGATIVE for significant arthralgias or myalgia  NEURO: NEGATIVE for weakness, dizziness or paresthesias  PSYCHIATRIC: NEGATIVE for changes in mood or affect    OBJECTIVE:   BP (!) 156/102 (BP Location: Left arm, Cuff Size: Adult Regular)  Pulse 78  Temp 98  F (36.7  C) (Tympanic)  Ht 5' 10.75\" (1.797 m)  Wt 188 lb 1.6 oz (85.3 kg)  BMI 26.42 kg/m2  EXAM:  GENERAL: healthy, alert and no distress  NECK: no adenopathy, no asymmetry, masses, or scars and thyroid normal to palpation  RESP: lungs clear to auscultation - no rales, rhonchi or wheezes  CV: regular rate and rhythm, normal S1 S2, no S3 or S4, no murmur, click or rub, no peripheral edema and peripheral pulses strong  ABDOMEN: soft, nontender, no hepatosplenomegaly, no masses and bowel sounds normal  MS: no gross musculoskeletal defects noted, no edema  Back: Straigth leg raise negbilat ; + tightness tendernes in perilumbar muscles; reflex 1+ and symetricle.  Knee, hip, great toe, flex and extend strength 5/5 bilat.    Diagnostic Test Results:  none     ASSESSMENT/PLAN:     1. Special screening for malignant neoplasms, colon    - GASTROENTEROLOGY ADULT REF PROCEDURE ONLY [facility] [phone]    (M54.42) Acute bilateral low back pain with left-sided sciatica  Chronic. Back extension and core exerrcses reviewed  Plan: gabapentin (NEURONTIN) 100 MG capsule            (I10) Benign essential hypertension  Comment: recheck I 2 weeks.   Plan: amLODIPine (NORVASC) 2.5 MG tablet            COUNSELING:  Reviewed preventive health counseling, as reflected in patient instructions       Regular exercise       Healthy diet/nutrition       Vision screening       Hearing screening       Colon cancer screening       Prostate cancer " "screening    BP Readings from Last 1 Encounters:   10/24/18 (!) 156/102     Estimated body mass index is 26.42 kg/(m^2) as calculated from the following:    Height as of this encounter: 5' 10.75\" (1.797 m).    Weight as of this encounter: 188 lb 1.6 oz (85.3 kg).    (Z12.11) Special screening for malignant neoplasms, colon  (primary encounter diagnosis)  Plan: GASTROENTEROLOGY ADULT REF PROCEDURE ONLY [facility] [phone]    (Z00.00) Preventative health care  Plan: GLUCOSE,  (Z12.5) Special screening for malignant neoplasm of prostate    Plan: PSA, screen,      Weight management plan: Discussed healthy diet and exercise guidelines and patient will follow up in 12 months in clinic to re-evaluate.     reports that he has never smoked. He has never used smokeless tobacco.      Counseling Resources:  ATP IV Guidelines  Pooled Cohorts Equation Calculator  FRAX Risk Assessment  ICSI Preventive Guidelines  Dietary Guidelines for Americans, 2010  USDA's MyPlate  ASA Prophylaxis  Lung CA Screening    Wilber Dupree MD  [facility]  "

## 2018-11-01 ENCOUNTER — RADIOLOGY INJECTION OFFICE VISIT (OUTPATIENT)
Dept: PALLIATIVE MEDICINE | Facility: CLINIC | Age: 53
End: 2018-11-01
Payer: COMMERCIAL

## 2018-11-01 ENCOUNTER — HOSPITAL ENCOUNTER (OUTPATIENT)
Dept: RADIOLOGY | Facility: CLINIC | Age: 53
Discharge: HOME | End: 2018-11-01
Attending: ANESTHESIOLOGY | Admitting: ANESTHESIOLOGY
Payer: COMMERCIAL

## 2018-11-01 VITALS — HEART RATE: 82 BPM | RESPIRATION RATE: 16 BRPM | SYSTOLIC BLOOD PRESSURE: 146 MMHG | DIASTOLIC BLOOD PRESSURE: 93 MMHG

## 2018-11-01 DIAGNOSIS — M54.16 LUMBAR RADICULOPATHY: ICD-10-CM

## 2018-11-01 DIAGNOSIS — M51.26 LUMBAR DISC HERNIATION: ICD-10-CM

## 2018-11-01 DIAGNOSIS — M51.369 DDD (DEGENERATIVE DISC DISEASE), LUMBAR: ICD-10-CM

## 2018-11-01 DIAGNOSIS — M54.16 LUMBAR RADICULOPATHY: Primary | ICD-10-CM

## 2018-11-01 PROCEDURE — 25000128 H RX IP 250 OP 636: Performed by: ANESTHESIOLOGY

## 2018-11-01 PROCEDURE — 64483 NJX AA&/STRD TFRM EPI L/S 1: CPT | Mod: LT | Performed by: ANESTHESIOLOGY

## 2018-11-01 PROCEDURE — 64484 NJX AA&/STRD TFRM EPI L/S EA: CPT | Mod: LT | Performed by: ANESTHESIOLOGY

## 2018-11-01 PROCEDURE — 64483 NJX AA&/STRD TFRM EPI L/S 1: CPT | Mod: TC

## 2018-11-01 RX ORDER — DEXAMETHASONE SODIUM PHOSPHATE 10 MG/ML
20 INJECTION, SOLUTION INTRAMUSCULAR; INTRAVENOUS ONCE
Status: COMPLETED | OUTPATIENT
Start: 2018-11-01 | End: 2018-11-01

## 2018-11-01 RX ORDER — LIDOCAINE HYDROCHLORIDE 10 MG/ML
5 INJECTION, SOLUTION EPIDURAL; INFILTRATION; INTRACAUDAL; PERINEURAL ONCE
Status: COMPLETED | OUTPATIENT
Start: 2018-11-01 | End: 2018-11-01

## 2018-11-01 RX ORDER — METHYLPREDNISOLONE ACETATE 40 MG/ML
40 INJECTION, SUSPENSION INTRA-ARTICULAR; INTRALESIONAL; INTRAMUSCULAR; SOFT TISSUE ONCE
Status: COMPLETED | OUTPATIENT
Start: 2018-11-01 | End: 2018-11-01

## 2018-11-01 RX ORDER — IOPAMIDOL 612 MG/ML
15 INJECTION, SOLUTION INTRATHECAL ONCE
Status: COMPLETED | OUTPATIENT
Start: 2018-11-01 | End: 2018-11-01

## 2018-11-01 RX ORDER — BUPIVACAINE HYDROCHLORIDE 5 MG/ML
10 INJECTION, SOLUTION PERINEURAL ONCE
Status: COMPLETED | OUTPATIENT
Start: 2018-11-01 | End: 2018-11-01

## 2018-11-01 RX ADMIN — DEXAMETHASONE SODIUM PHOSPHATE 5 MG: 10 INJECTION, SOLUTION INTRAMUSCULAR; INTRAVENOUS at 11:03

## 2018-11-01 RX ADMIN — METHYLPREDNISOLONE ACETATE 40 MG: 40 INJECTION, SUSPENSION INTRA-ARTICULAR; INTRALESIONAL; INTRAMUSCULAR; SOFT TISSUE at 11:03

## 2018-11-01 RX ADMIN — IOPAMIDOL 1.5 ML: 612 INJECTION, SOLUTION INTRATHECAL at 11:05

## 2018-11-01 RX ADMIN — BUPIVACAINE HYDROCHLORIDE 1 ML: 5 INJECTION, SOLUTION EPIDURAL; INTRACAUDAL at 11:04

## 2018-11-01 RX ADMIN — LIDOCAINE HYDROCHLORIDE 2 ML: 10 INJECTION, SOLUTION EPIDURAL; INFILTRATION; INTRACAUDAL; PERINEURAL at 11:04

## 2018-11-01 ASSESSMENT — PAIN SCALES - GENERAL
PAINLEVEL: EXTREME PAIN (8)
PAINLEVEL: MODERATE PAIN (5)

## 2018-11-01 NOTE — PROGRESS NOTES
Pre-procedure Intake    Have you been fasting? No       If yes, for how long?     Are you taking a prescribed blood thinner such as coumadin, Plavix, Xarelto?    No    If yes, when did you take your last dose?     Do you take aspirin?  No    If cervical procedure, have you held aspirin for 6 days?   NA    Do you have any allergies to contrast dye, iodine, steroid and/or numbing medications?  NO    Are you currently taking antibiotics or have an active infection?  NO    Have you had a fever/elevated temperature within the past week? NO    Are you currently taking oral steroids? NO    Do you have a ? Yes       Are you pregnant or breastfeeding?  Not Applicable    Are the vital signs normal?  Yes

## 2018-11-01 NOTE — PROGRESS NOTES
Pre procedure Diagnosis: lumbar radiculopathy, lumbar degenerative disc disease   Post procedure Diagnosis: Same  Procedure performed: lumbar transforaminal epidural steroid injection at L3-4 and L4-5 on the left, fluoroscopically guided, contrast controlled  Anesthesia: none  Complications: none  Operators: Yakov Cornelius MD     Indications:   Darrel Rabago is a 52 year old male was sent by Dr. Karl Najera for lumbar epidural steroid injection.  They have a history of chronic lower back pain with pain radiating into the left hip and anterolateral thigh.  Exam shows antalgic gait, lumbar tenderness, and SLR was not performed and they have tried conservative treatment including physical therapy, medications.    MRI was done on 10/18/18 which showed   FINDINGS: Radiographs show five lumbar-type vertebral bodies.  The  distal spinal cord and cauda equina appear normal.     T12-L1:   Normal disc, facet joints, spinal canal and neural foramina.       L1-L2:   Normal disc, facet joints, spinal canal and neural foramina.       L2-L3:  Loss of disc height and decreased T2 signal in the disc. Mild  circumferential disc bulge mainly anteriorly with small anterior  vertebral endplate osteophytes. Normal spinal canal, neural foramina  and facet joints.     L3-L4:  Decreased T2 signal in the disc. Left lateral disc bulge  extending into the inferior aspect of the left neural foramen causing  slight impression on the left L3 dorsal nerve root ganglion. Normal  spinal canal, facet joints and right foramen.     L4-L5: Left posterolateral disc bulge with high intensity zone  indicating an annular fissure at the left posterolateral aspect of the  disc. This abuts the left L4 dorsal nerve root ganglion. Mild spinal  canal stenosis from disc bulge and hypertrophied ligamentum flavum.  Normal facet joints and right foramen.     L5-S1:  Decreased T2 signal in the disc. Mild broad-based posterior  disc bulge with high intensity zone  posteriorly just to the right of  midline indicating annular fissure. Normal neural foramina, spinal  canal and facet joints.     Paraspinous soft tissues:   Normal.       Bone marrow:   Normal.        IMPRESSION:  Multilevel degenerative disc disease as described above.  Left lateral disc bulges at L3-L4 and L4-L5 may be accounting for the  patient's symptoms.    Options/alternatives, benefits and risks were discussed with the patient including bleeding, infection, tissue trauma, numbness, weakness, paralysis, spinal cord injury, radiation exposure, headache and reaction to medications. Questions were answered to his satisfaction and he agrees to proceed. Voluntary informed consent was obtained and signed.     Vitals were reviewed: Yes  BP (!) 146/93  Pulse 82  Resp 16  Allergies were reviewed:  Yes   Medications were reviewed:  Yes   Pre-procedure pain score: 8/10    Procedure:  After getting informed consent, patient was brought into the procedure suite and was placed in a prone position on the procedure table.   A Pause for the Cause was performed.  Patient was prepped and draped in sterile fashion.     After identifying the left L3-4 and L4-5 neuroforamen, the C-arm was rotated to a left lateral oblique angle.  A total of 2 ml of Lidocaine 1% was used to anesthetize the skin and the needle tracks at the skin entry sites coaxial with the fluoroscopy beam, and overriding the superior aspect of the neuroforamen.  Then, 25 gauge 5 inch spinal needles were advanced under intermittent fluoroscopy until they entered the foramen superiorly beneath the pedicles.    The needle positions were then inspected from anteroposterior and lateral views, and the needles adjusted appropriately.  Aspiration was negative at both levels.  A total of 1.5 ml of Isovue-300 was injected, confirming appropriate needle positions, with spread into the nerve root sheath and the epidural space at both levels, with no intravascular uptake.  13.5 ml was wasted    Then, after repeated negative aspiration, each level was injected with 2.5 ml of a combination of Depomedrol 40 mg, Decadron 5 mg, 0.5% bupivacaine 1.5 ml, diluted with 2 ml of normal saline for a total injectate volume of 5 ml and the needles were flushed with lidocaine and removed.    During the procedure, there was not a paresthesia.  Hemostasis was achieved, the area was cleaned, and bandaids were placed when appropriate.  The patient tolerated the procedure well, and was taken to the recovery room.    Images were saved to PACS.    Post-procedure pain score: 5/10  Follow-up includes:   -f/u phone call in one week  -f/u with referring provider    Yakov Cornelius MD  [facility]

## 2018-11-01 NOTE — DISCHARGE INSTRUCTIONS
[facility]   Procedure Discharge Instructions    Today you saw:  Dr. Yakov Cornelius      You had a:  Lumbar trans-foraminal epidural steroid injection, left L3-4, L4-5     Medications used:  Lidocaine   Bupivacaine   Dexamethasone Depo-medrol   Isovue            Be cautious when walking. Numbness and/or weakness in the lower extremities may occur for up to 6-8 hours after the procedure due to effect of the local anesthetic    Do not drive for 6 hours. The effect of the local anesthetic could slow your reflexes.     You may resume your regular activities after 24 hours    Avoid strenuous activity for the first 24 hours    You may shower, however avoid swimming, tub baths or hot tubs for 24 hours following your procedure    You may have a mild to moderate increase in pain for several days following the injection.    It may take up to 14 days for the steroid medication to start working although you may feel the effect as early as a few days after the procedure.       You may use ice packs for 10-15 minutes, 3 to 4 times a day at the injection site for comfort    Do not use heat to painful areas for 6 to 8 hours. This will give the local anesthetic time to wear off and prevent you from accidentally burning your skin.     You may use anti-inflammatory medications (such as Ibuprofen or Aleve or Advil) or Tylenol for pain control if necessary    If you were fasting, you may resume your normal diet and medications after the procedure    If you experience any of the following, call the Pain Clinic during work hours at [phone] or the Provider Line after hours at [phone]:  -Fever over 100 degree F  -Swelling, bleeding, redness, drainage, warmth at the injection site  -Progressive weakness or numbness in your legs or arms  -Loss of bowel or bladder function  -Unusual headache that is not relieved by Tylenol or other pain reliever  -Unusual new onset of pain that is not improving

## 2018-11-01 NOTE — IP AVS SNAPSHOT
[facility]    [address]    [facility] [place] [address]    Phone:  [phone]    Fax:  [phone]                                       After Visit Summary   11/1/2018    Darrel Rabago    MRN: [id number]           After Visit Summary Signature Page     I have received my discharge instructions, and my questions have been answered. I have discussed any challenges I see with this plan with the nurse or doctor.    ..........................................................................................................................................  Patient/Patient Representative Signature      ..........................................................................................................................................  Patient Representative Print Name and Relationship to Patient    ..................................................               ................................................  Date                                   Time    ..........................................................................................................................................  Reviewed by Signature/Title    ...................................................              ..............................................  Date                                               Time          22EPIC Rev 08/18

## 2018-11-01 NOTE — IP AVS SNAPSHOT
MRN:[id number]                      After Visit Summary   11/1/2018    Darrel Rabago    MRN: [id number]           Visit Information        Provider Department      11/1/2018 10:45 AM RUBÉN [facility]           Review of your medicines      UNREVIEWED medicines. Ask your doctor about these medicines        Dose / Directions    amLODIPine 2.5 MG tablet   Commonly known as:  NORVASC   Used for:  Benign essential hypertension        Dose:  2.5 mg   Take 1 tablet (2.5 mg) by mouth daily   Quantity:  30 tablet   Refills:  1       diazepam 5 MG tablet   Commonly known as:  VALIUM   Used for:  Left hip pain        Dose:  5 mg   Take 1 tablet (5 mg) by mouth every 12 hours as needed for muscle spasms or sleep   Quantity:  30 tablet   Refills:  0       diclofenac 75 MG EC tablet   Commonly known as:  VOLTAREN        Dose:  75 mg   Take 1 tablet (75 mg) by mouth 2 times daily as needed for moderate pain   Quantity:  30 tablet   Refills:  0       gabapentin 100 MG capsule   Commonly known as:  NEURONTIN   Used for:  Acute bilateral low back pain with left-sided sciatica        Dose:  100 mg   Take 1 capsule (100 mg) by mouth 3 times daily   Quantity:  90 capsule   Refills:  0       oxyCODONE-acetaminophen 5-325 MG per tablet   Commonly known as:  PERCOCET   Used for:  Left hip pain        Dose:  1 tablet   Take 1 tablet by mouth every 4 hours as needed for pain   Quantity:  15 tablet   Refills:  0                Protect others around you: Learn how to safely use, store and throw away your medicines at www.disposemymeds.org.         Follow-ups after your visit         Care Instructions        Further instructions from your care team       [facility]   Procedure Discharge Instructions    Today you saw:  Dr. Yakov Cornelius      You had a:  Lumbar trans-foraminal epidural steroid injection, left L3-4, L4-5     Medications used:  Lidocaine   Bupivacaine   Dexamethasone  Depo-medrol   Isovue            Be cautious when walking. Numbness and/or weakness in the lower extremities may occur for up to 6-8 hours after the procedure due to effect of the local anesthetic    Do not drive for 6 hours. The effect of the local anesthetic could slow your reflexes.     You may resume your regular activities after 24 hours    Avoid strenuous activity for the first 24 hours    You may shower, however avoid swimming, tub baths or hot tubs for 24 hours following your procedure    You may have a mild to moderate increase in pain for several days following the injection.    It may take up to 14 days for the steroid medication to start working although you may feel the effect as early as a few days after the procedure.       You may use ice packs for 10-15 minutes, 3 to 4 times a day at the injection site for comfort    Do not use heat to painful areas for 6 to 8 hours. This will give the local anesthetic time to wear off and prevent you from accidentally burning your skin.     You may use anti-inflammatory medications (such as Ibuprofen or Aleve or Advil) or Tylenol for pain control if necessary    If you were fasting, you may resume your normal diet and medications after the procedure    If you experience any of the following, call the Pain Clinic during work hours at [phone] or the Provider Line after hours at [phone]:  -Fever over 100 degree F  -Swelling, bleeding, redness, drainage, warmth at the injection site  -Progressive weakness or numbness in your legs or arms  -Loss of bowel or bladder function  -Unusual headache that is not relieved by Tylenol or other pain reliever  -Unusual new onset of pain that is not improving           Additional Information About Your Visit        [facility] Information     [facility] gives you secure access to your electronic health record. If you see a primary care provider, you can also send messages to your care team and make appointments. If you have  questions, please call your primary care clinic.  If you do not have a primary care provider, please call [phone] and they will assist you.        Care EveryWhere ID     This is your Care EveryWhere ID. This could be used by other organizations to access your [facility] medical records  [id number]         Primary Care Provider Office Phone # Fax #    Wilber Dupree [phone]      Equal Access to Services     SILVESTRE THOMAS : Hadii aad ku hadasho Soomaali, waaxda luqadaha, qaybta kaalmada adeegyada, claudia mueller hayrupertamber christensenricharddeedee colunga . So [facility] [phone].    ATENCIÓN: Si marshala lara, tiene a chan disposición servicios gratuitos de asistencia lingüística. Llame al [phone].    We comply with applicable federal civil rights laws and [place] laws. We do not discriminate on the basis of race, color, national origin, age, disability, sex, sexual orientation, or gender identity.            Thank you!     Thank you for choosing [facility] for your care. Our goal is always to provide you with excellent care. Hearing back from our patients is one way we can continue to improve our services. Please take a few minutes to complete the written survey that you may receive in the mail after you visit with us. Thank you!             Medication List: This is a list of all your medications and when to take them. Check marks below indicate your daily home schedule. Keep this list as a reference.      Medications           Morning Afternoon Evening Bedtime As Needed    amLODIPine 2.5 MG tablet   Commonly known as:  NORVASC   Take 1 tablet (2.5 mg) by mouth daily                                diazepam 5 MG tablet   Commonly known as:  VALIUM   Take 1 tablet (5 mg) by mouth every 12 hours as needed for muscle spasms or sleep                                diclofenac 75 MG EC tablet   Commonly known as:  VOLTAREN   Take 1 tablet (75 mg) by mouth 2 times daily as needed for moderate pain                                 gabapentin 100 MG capsule   Commonly known as:  NEURONTIN   Take 1 capsule (100 mg) by mouth 3 times daily                                oxyCODONE-acetaminophen 5-325 MG per tablet   Commonly known as:  PERCOCET   Take 1 tablet by mouth every 4 hours as needed for pain

## 2018-11-09 ENCOUNTER — MYC MEDICAL ADVICE (OUTPATIENT)
Dept: ORTHOPEDICS | Facility: CLINIC | Age: 53
End: 2018-11-09

## 2018-11-09 DIAGNOSIS — M54.16 LUMBAR RADICULOPATHY: ICD-10-CM

## 2018-11-09 DIAGNOSIS — M25.552 LEFT HIP PAIN: Primary | ICD-10-CM

## 2018-11-09 NOTE — TELEPHONE ENCOUNTER
Discussed with Darrel, talked about Spine Surgeon discussion vs EMG.  EMG ordered.  Has has some relief in pain and return of some strength s/p KALEY.  All questions asnwered.

## 2018-11-20 ENCOUNTER — MYC MEDICAL ADVICE (OUTPATIENT)
Dept: FAMILY MEDICINE | Facility: CLINIC | Age: 53
End: 2018-11-20

## 2018-11-20 DIAGNOSIS — I10 BENIGN ESSENTIAL HYPERTENSION: ICD-10-CM

## 2018-11-21 RX ORDER — AMLODIPINE BESYLATE 2.5 MG/1
2.5 TABLET ORAL DAILY
Qty: 30 TABLET | Refills: 1 | Status: SHIPPED | OUTPATIENT
Start: 2018-11-21 | End: 2019-01-18

## 2018-11-28 ENCOUNTER — TRANSFERRED RECORDS (OUTPATIENT)
Dept: HEALTH INFORMATION MANAGEMENT | Facility: CLINIC | Age: 53
End: 2018-11-28

## 2019-01-18 DIAGNOSIS — I10 BENIGN ESSENTIAL HYPERTENSION: ICD-10-CM

## 2019-01-18 NOTE — TELEPHONE ENCOUNTER
Routing refill request to provider for review/approval because:  Blood pressure not in range.    Francesca Stokes RN

## 2019-01-18 NOTE — TELEPHONE ENCOUNTER
"Requested Prescriptions   Pending Prescriptions Disp Refills     amLODIPine (NORVASC) 2.5 MG tablet [Pharmacy Med Name: AMLODIPINE BESYLATE 2.5 MG TAB] 30 tablet 1    Last Written Prescription Date:  11/21/18  Last Fill Quantity: 30,  # refills: 1   Last office visit: 10/24/2018 with prescribing provider:  margaux   Future Office Visit:     Sig: TAKE 1 TABLET BY MOUTH EVERY DAY    Calcium Channel Blockers Protocol  Failed - 1/18/2019  1:37 AM       Failed - Blood pressure under 140/90 in past 12 months    BP Readings from Last 3 Encounters:   11/01/18 (!) 146/93   10/24/18 (!) 156/102   10/10/18 (!) 173/95                Failed - Normal serum creatinine on file in past 12 months    No lab results found.         Passed - Recent (12 mo) or future (30 days) visit within the authorizing provider's specialty    Patient had office visit in the last 12 months or has a visit in the next 30 days with authorizing provider or within the authorizing provider's specialty.  See \"Patient Info\" tab in inbasket, or \"Choose Columns\" in Meds & Orders section of the refill encounter.             Passed - Medication is active on med list       Passed - Patient is age 18 or older          "

## 2019-01-20 RX ORDER — AMLODIPINE BESYLATE 2.5 MG/1
TABLET ORAL
Qty: 30 TABLET | Refills: 1 | Status: SHIPPED | OUTPATIENT
Start: 2019-01-20 | End: 2019-03-29

## 2019-03-29 DIAGNOSIS — I10 BENIGN ESSENTIAL HYPERTENSION: ICD-10-CM

## 2019-03-29 NOTE — TELEPHONE ENCOUNTER
Routing refill request to provider for review/approval because:  Labs not current:  Creatinine.   Last BPs are not at goal.     BP Readings from Last 6 Encounters:   11/01/18 (!) 146/93   10/24/18 (!) 156/102   10/10/18 (!) 173/95   10/03/18 (!) 170/101   10/02/18 150/89   05/20/17 156/84     30 day pended with note to pharmacy.      ELBA MulliganN, RN

## 2019-03-29 NOTE — TELEPHONE ENCOUNTER
"Requested Prescriptions   Pending Prescriptions Disp Refills     amLODIPine (NORVASC) 2.5 MG tablet [Pharmacy Med Name: AMLODIPINE BESYLATE 2.5 MG TAB] 30 tablet 1    Last Written Prescription Date:  1/20/19  Last Fill Quantity: 30,  # refills: 1   Last office visit: 10/24/2018 with prescribing provider:  [facility]   Future Office Visit:     Sig: TAKE 1 TABLET BY MOUTH EVERY DAY    Calcium Channel Blockers Protocol  Failed - 3/29/2019  1:24 AM       Failed - Blood pressure under 140/90 in past 12 months    BP Readings from Last 3 Encounters:   11/01/18 (!) 146/93   10/24/18 (!) 156/102   10/10/18 (!) 173/95                Failed - Normal serum creatinine on file in past 12 months    No lab results found.         Passed - Recent (12 mo) or future (30 days) visit within the authorizing provider's specialty    Patient had office visit in the last 12 months or has a visit in the next 30 days with authorizing provider or within the authorizing provider's specialty.  See \"Patient Info\" tab in inbasket, or \"Choose Columns\" in Meds & Orders section of the refill encounter.             Passed - Medication is active on med list       Passed - Patient is age 18 or older          "

## 2019-03-31 RX ORDER — AMLODIPINE BESYLATE 2.5 MG/1
TABLET ORAL
Qty: 30 TABLET | Refills: 0 | Status: SHIPPED | OUTPATIENT
Start: 2019-03-31 | End: 2019-04-28

## 2019-04-28 DIAGNOSIS — I10 BENIGN ESSENTIAL HYPERTENSION: ICD-10-CM

## 2019-04-29 NOTE — TELEPHONE ENCOUNTER
"Requested Prescriptions   Pending Prescriptions Disp Refills     amLODIPine (NORVASC) 2.5 MG tablet [Pharmacy Med Name: AMLODIPINE  Last Written Prescription Date:  03/31/19  Last Fill Quantity: 30,  # refills: 0   Last office visit: 10/24/2018 with prescribing provider:  Wilber Dupree   Future Office Visit:     BESYLATE 2.5 MG TAB] 30 tablet 0     Sig: TAKE 1 TABLET BY MOUTH EVERY DAY       Calcium Channel Blockers Protocol  Failed - 4/28/2019 12:27 AM        Failed - Blood pressure under 140/90 in past 12 months     BP Readings from Last 3 Encounters:   11/01/18 (!) 146/93   10/24/18 (!) 156/102   10/10/18 (!) 173/95           Failed - Normal serum creatinine on file in past 12 months     No lab results found.          Passed - Recent (12 mo) or future (30 days) visit within the authorizing provider's specialty     Patient had office visit in the last 12 months or has a visit in the next 30 days with authorizing provider or within the authorizing provider's specialty.  See \"Patient Info\" tab in inbasket, or \"Choose Columns\" in Meds & Orders section of the refill encounter.          Passed - Medication is active on med list        Passed - Patient is age 18 or older          "

## 2019-04-30 RX ORDER — AMLODIPINE BESYLATE 2.5 MG/1
TABLET ORAL
Qty: 30 TABLET | Refills: 0 | Status: SHIPPED | OUTPATIENT
Start: 2019-04-30 | End: 2019-05-31

## 2019-04-30 NOTE — TELEPHONE ENCOUNTER
Medication is being filled for 1 time refill only due to:  Patient needs labs cmp. Future labs ordered yes. Patient needs to be seen because needs BP check.   Bill Flowers RN

## 2019-05-23 ENCOUNTER — TELEPHONE (OUTPATIENT)
Dept: FAMILY MEDICINE | Facility: CLINIC | Age: 54
End: 2019-05-23

## 2019-05-23 DIAGNOSIS — Z12.11 SPECIAL SCREENING FOR MALIGNANT NEOPLASMS, COLON: Primary | ICD-10-CM

## 2019-05-31 DIAGNOSIS — I10 BENIGN ESSENTIAL HYPERTENSION: ICD-10-CM

## 2019-05-31 RX ORDER — AMLODIPINE BESYLATE 2.5 MG/1
TABLET ORAL
Qty: 30 TABLET | Refills: 0 | Status: SHIPPED | OUTPATIENT
Start: 2019-05-31 | End: 2019-06-30

## 2019-05-31 NOTE — TELEPHONE ENCOUNTER
"Requested Prescriptions   Pending Prescriptions Disp Refills     amLODIPine (NORVASC) 2.5 MG tablet [Pharmacy Med Name: AMLODIPINE BESYLATE 2.5 MG TAB]  Last Written Prescription Date:  4/30/19  Last Fill Quantity: 30,  # refills: 0   Last office visit: 10/24/2018 with prescribing provider:  margaux   Future Office Visit:     30 tablet 0     Sig: TAKE 1 TABLET BY MOUTH EVERY DAY       Calcium Channel Blockers Protocol  Failed - 5/31/2019  1:26 AM        Failed - Blood pressure under 140/90 in past 12 months     BP Readings from Last 3 Encounters:   11/01/18 (!) 146/93   10/24/18 (!) 156/102   10/10/18 (!) 173/95                 Failed - Normal serum creatinine on file in past 12 months     No lab results found.          Passed - Recent (12 mo) or future (30 days) visit within the authorizing provider's specialty     Patient had office visit in the last 12 months or has a visit in the next 30 days with authorizing provider or within the authorizing provider's specialty.  See \"Patient Info\" tab in inbasket, or \"Choose Columns\" in Meds & Orders section of the refill encounter.              Passed - Medication is active on med list        Passed - Patient is age 18 or older          "

## 2019-05-31 NOTE — TELEPHONE ENCOUNTER
Spoke with patient   Advised appt   Made for next week will come fasting   MEHRAN Sampson RN/Jorje Vega    Prescription approved per [facility] Refill Protocol or patient Primary care provider (PCP)  MEHRAN Sampson RN/Jorje Vega

## 2019-06-06 ENCOUNTER — OFFICE VISIT (OUTPATIENT)
Dept: FAMILY MEDICINE | Facility: CLINIC | Age: 54
End: 2019-06-06
Payer: COMMERCIAL

## 2019-06-06 VITALS
RESPIRATION RATE: 14 BRPM | DIASTOLIC BLOOD PRESSURE: 89 MMHG | SYSTOLIC BLOOD PRESSURE: 135 MMHG | WEIGHT: 188.7 LBS | BODY MASS INDEX: 26.42 KG/M2 | TEMPERATURE: 97.6 F | HEIGHT: 71 IN | HEART RATE: 75 BPM

## 2019-06-06 DIAGNOSIS — Z12.11 SPECIAL SCREENING FOR MALIGNANT NEOPLASMS, COLON: ICD-10-CM

## 2019-06-06 DIAGNOSIS — Z00.01 ENCOUNTER FOR ROUTINE ADULT MEDICAL EXAM WITH ABNORMAL FINDINGS: ICD-10-CM

## 2019-06-06 DIAGNOSIS — Z11.4 SCREENING FOR HIV WITHOUT PRESENCE OF RISK FACTORS: ICD-10-CM

## 2019-06-06 DIAGNOSIS — Z00.00 ROUTINE GENERAL MEDICAL EXAMINATION AT A HEALTH CARE FACILITY: ICD-10-CM

## 2019-06-06 DIAGNOSIS — M22.2X2 PATELLOFEMORAL PAIN SYNDROME OF LEFT KNEE: ICD-10-CM

## 2019-06-06 DIAGNOSIS — Z13.220 SCREENING FOR HYPERLIPIDEMIA: Primary | ICD-10-CM

## 2019-06-06 LAB
CHOLEST SERPL-MCNC: 202 MG/DL
GLUCOSE SERPL-MCNC: 102 MG/DL (ref 70–99)
HDLC SERPL-MCNC: 42 MG/DL
LDLC SERPL CALC-MCNC: 132 MG/DL
NONHDLC SERPL-MCNC: 160 MG/DL
TRIGL SERPL-MCNC: 141 MG/DL

## 2019-06-06 PROCEDURE — 82947 ASSAY GLUCOSE BLOOD QUANT: CPT | Performed by: FAMILY MEDICINE

## 2019-06-06 PROCEDURE — 80061 LIPID PANEL: CPT | Performed by: FAMILY MEDICINE

## 2019-06-06 PROCEDURE — 87389 HIV-1 AG W/HIV-1&-2 AB AG IA: CPT | Performed by: FAMILY MEDICINE

## 2019-06-06 PROCEDURE — 36415 COLL VENOUS BLD VENIPUNCTURE: CPT | Performed by: FAMILY MEDICINE

## 2019-06-06 PROCEDURE — 99396 PREV VISIT EST AGE 40-64: CPT | Performed by: FAMILY MEDICINE

## 2019-06-06 ASSESSMENT — PAIN SCALES - GENERAL: PAINLEVEL: NO PAIN (0)

## 2019-06-06 ASSESSMENT — MIFFLIN-ST. JEOR: SCORE: 1724.1

## 2019-06-06 NOTE — PROGRESS NOTES
SUBJECTIVE:   CC: Darrel Rabago is an 53 year old male who presents for preventive health visit.     Healthy Habits:    Do you get at least three servings of calcium containing foods daily (dairy, green leafy vegetables, etc.)? yes    Amount of exercise or daily activities, outside of work: 5-6 day(s) per week    Problems taking medications regularly No    Medication side effects: No    Have you had an eye exam in the past two years? yes    Do you see a dentist twice per year? no    Do you have sleep apnea, excessive snoring or daytime drowsiness?yes, snoring    Wt Readings from Last 10 Encounters:   06/06/19 85.6 kg (188 lb 11.2 oz)   10/24/18 85.3 kg (188 lb 1.6 oz)   10/10/18 89.8 kg (198 lb)   10/03/18 89.8 kg (198 lb)   10/02/18 87.1 kg (192 lb)   05/20/17 82.6 kg (182 lb)   06/26/15 83.9 kg (185 lb)   06/03/15 86.3 kg (190 lb 3.2 oz)   05/22/12 83.9 kg (185 lb)   02/08/07 82.6 kg (182 lb)     Patient informed that anything we discuss that is not related to preventative medicine, may be billed for; patient verbalizes understanding.    **His back is feeling better from his last visit, but he wants to discuss muscle in left knee.     Hypertension Follow-up      Do you check your blood pressure regularly outside of the clinic? Yes     Are you following a low salt diet? Yes    Are your blood pressures ever more than 140 on the top number (systolic) OR more   than 90 on the bottom number (diastolic), for example 140/90? No    Today's PHQ-2 Score:   PHQ-2 ( 1999 [employer]) 6/6/2019 10/24/2018   Q1: Little interest or pleasure in doing things 0 1   Q2: Feeling down, depressed or hopeless 0 1   PHQ-2 Score 0 2     Abuse: Current or Past(Physical, Sexual or Emotional)- No  Do you feel safe in your environment? Yes    Social History     Tobacco Use     Smoking status: Never Smoker     Smokeless tobacco: Never Used   Substance Use Topics     Alcohol use: Yes     Comment: 10-12 drinks per week     If you drink alcohol do  you typically have >3 drinks per day or >7 drinks per week? No                      Last PSA: No results found for: PSA    Reviewed orders with patient. Reviewed health maintenance and updated orders accordingly - Yes  BP Readings from Last 3 Encounters:   06/06/19 135/89   11/01/18 (!) 146/93   10/24/18 (!) 156/102    Wt Readings from Last 3 Encounters:   06/06/19 85.6 kg (188 lb 11.2 oz)   10/24/18 85.3 kg (188 lb 1.6 oz)   10/10/18 89.8 kg (198 lb)                  Patient Active Problem List   Diagnosis     CARDIOVASCULAR SCREENING; LDL GOAL LESS THAN 130     Acute bilateral low back pain with left-sided sciatica     Past Surgical History:   Procedure Laterality Date     C APPENDECTOMY  1975       Social History     Tobacco Use     Smoking status: Never Smoker     Smokeless tobacco: Never Used   Substance Use Topics     Alcohol use: Yes     Comment: 10-12 drinks per week     Family History   Problem Relation Age of Onset     Cancer Father         skin     Dermatomyositis Mother      Alzheimer Disease Maternal Grandmother      Heart Disease Maternal Grandfather      Diabetes Maternal Grandfather      Arthritis Paternal Grandmother      Alzheimer Disease Paternal Grandmother      Heart Disease Paternal Grandfather          Current Outpatient Medications   Medication Sig Dispense Refill     amLODIPine (NORVASC) 2.5 MG tablet TAKE 1 TABLET BY MOUTH EVERY DAY 30 tablet 0     No Known Allergies    Reviewed and updated as needed this visit by clinical staff  Tobacco  Allergies  Meds  Med Hx  Surg Hx  Fam Hx  Soc Hx        Reviewed and updated as needed this visit by Provider            ROS:  CONSTITUTIONAL: NEGATIVE for fever, chills, change in weight  INTEGUMENTARY/SKIN: NEGATIVE for worrisome rashes, moles or lesions  EYES: NEGATIVE for vision changes or irritation  ENT: NEGATIVE for ear, mouth and throat problems  RESP: NEGATIVE for significant cough or SOB  CV: NEGATIVE for chest pain, palpitations or  "peripheral edema  GI: NEGATIVE for nausea, abdominal pain, heartburn, or change in bowel habits   male: negative for dysuria, hematuria, decreased urinary stream, erectile dysfunction, urethral discharge  MUSCULOSKELETAL: NEGATIVE for significant arthralgias or myalgia  NEURO: NEGATIVE for weakness, dizziness or paresthesias  PSYCHIATRIC: NEGATIVE for changes in mood or affect    OBJECTIVE:   /89 (BP Location: Right arm, Patient Position: Sitting, Cuff Size: Adult Large)   Pulse 75   Temp 97.6  F (36.4  C) (Tympanic)   Resp 14   Ht 1.797 m (5' 10.75\")   Wt 85.6 kg (188 lb 11.2 oz)   BMI 26.50 kg/m    EXAM:  GENERAL: healthy, alert and no distress  NECK: no adenopathy, no asymmetry, masses, or scars and thyroid normal to palpation  RESP: lungs clear to auscultation - no rales, rhonchi or wheezes  CV: regular rate and rhythm, normal S1 S2, no S3 or S4, no murmur, click or rub, no peripheral edema and peripheral pulses strong  ABDOMEN: soft, nontender, no hepatosplenomegaly, no masses and bowel sounds normal  MS: no gross musculoskeletal defects noted, no edema    Diagnostic Test Results:  Labs reviewed in Epic    ASSESSMENT/PLAN:   (Z13.220) Screening for hyperlipidemia  (primary encounter diagnosis)  Plan: Lipid panel reflex to direct LDL Fasting    (Z00.00) Routine general medical examination at a health care facility  Plan: GLUCOSE    (Z00.01) Encounter for routine adult medical exam with abnormal findings    (Z11.4) Screening for HIV without presence of risk factors  Plan: HIV Screening    (Z12.11) Special screening for malignant neoplasms, colon  Plan: GASTROENTEROLOGY ADULT REF PROCEDURE ONLY      (M22.2X2) Patellofemoral pain syndrome of left knee  Plan: COOKIE PT, HAND, AND CHIROPRACTIC REFERRAL      COUNSELING:  Reviewed preventive health counseling, as reflected in patient instructions       Regular exercise       Healthy diet/nutrition       Vision screening       Immunizations                 " "HIV screeninx in teen years, 1x in adult years, and at intervals if high risk       Colon cancer screening       Prostate cancer screening    Estimated body mass index is 26.5 kg/m  as calculated from the following:    Height as of this encounter: 1.797 m (5' 10.75\").    Weight as of this encounter: 85.6 kg (188 lb 11.2 oz).    Weight management plan: Discussed healthy diet and exercise guidelines     reports that he has never smoked. He has never used smokeless tobacco.      Counseling Resources:  ATP IV Guidelines  Pooled Cohorts Equation Calculator  FRAX Risk Assessment  ICSI Preventive Guidelines  Dietary Guidelines for Americans, 2010  USDA's MyPlate  ASA Prophylaxis  Lung CA Screening    Wilber Dupree MD  [facility]  "

## 2019-06-07 LAB — HIV 1+2 AB+HIV1 P24 AG SERPL QL IA: NONREACTIVE

## 2019-06-30 DIAGNOSIS — I10 BENIGN ESSENTIAL HYPERTENSION: ICD-10-CM

## 2019-07-01 NOTE — TELEPHONE ENCOUNTER
"AMLODIPINE BESYLATE 2.5 MG TAB      Last Written Prescription Date:  5/31/19  Last Fill Quantity: 30,   # refills: 0  Last Office Visit: 6/6/19  Future Office visit:       Requested Prescriptions   Pending Prescriptions Disp Refills     amLODIPine (NORVASC) 2.5 MG tablet [Pharmacy Med Name: AMLODIPINE BESYLATE 2.5 MG TAB] 30 tablet 0     Sig: TAKE 1 TABLET BY MOUTH EVERY DAY       Calcium Channel Blockers Protocol  Failed - 6/30/2019 12:38 AM        Failed - Normal serum creatinine on file in past 12 months     No lab results found.          Passed - Blood pressure under 140/90 in past 12 months     BP Readings from Last 3 Encounters:   06/06/19 135/89   11/01/18 (!) 146/93   10/24/18 (!) 156/102                 Passed - Recent (12 mo) or future (30 days) visit within the authorizing provider's specialty     Patient had office visit in the last 12 months or has a visit in the next 30 days with authorizing provider or within the authorizing provider's specialty.  See \"Patient Info\" tab in inbasket, or \"Choose Columns\" in Meds & Orders section of the refill encounter.              Passed - Medication is active on med list        Passed - Patient is age 18 or older          "

## 2019-07-02 RX ORDER — AMLODIPINE BESYLATE 2.5 MG/1
TABLET ORAL
Qty: 90 TABLET | Refills: 0 | Status: SHIPPED | OUTPATIENT
Start: 2019-07-02 | End: 2019-07-11

## 2019-07-02 NOTE — TELEPHONE ENCOUNTER
Medication is being filled for 1 time refill only due to:  Patient needs labs cmp. Future labs ordered yes.   Bill Flowers RN

## 2019-07-04 DIAGNOSIS — I10 BENIGN ESSENTIAL HYPERTENSION: ICD-10-CM

## 2019-07-05 RX ORDER — AMLODIPINE BESYLATE 2.5 MG/1
TABLET ORAL
Qty: 90 TABLET | Refills: 1 | Status: SHIPPED | OUTPATIENT
Start: 2019-07-05 | End: 2020-04-01

## 2019-07-05 NOTE — TELEPHONE ENCOUNTER
Routing refill request to provider for review/approval because:  Lisa given x1 and patient did not follow up, please advise  Patient was informed of need for labs, did not follow up.  Needs chemistry.  Will send "[employer]" message.  Brenna Byrne RN

## 2019-07-05 NOTE — TELEPHONE ENCOUNTER
Had OV 6/6/19 - labs drawn, unfortunately doesn't appear CMP was drawn.  Refilled - next lab draw should get a CMP  Carmel

## 2019-07-05 NOTE — TELEPHONE ENCOUNTER
"Requested Prescriptions   Pending Prescriptions Disp Refills     amLODIPine (NORVASC) 2.5 MG tablet [Pharmacy Med Name: AMLODIPINE BESYLATE 2.5 MG TAB]  Last Written Prescription Date:  7/2/19  Last Fill Quantity: 90,  # refills: 0   Last office visit: 6/6/2019 with prescribing provider:  margaux   Future Office Visit:     30 tablet 0     Sig: TAKE 1 TABLET BY MOUTH EVERY DAY       Calcium Channel Blockers Protocol  Failed - 7/4/2019 11:22 AM        Failed - Normal serum creatinine on file in past 12 months     No lab results found.          Passed - Blood pressure under 140/90 in past 12 months     BP Readings from Last 3 Encounters:   06/06/19 135/89   11/01/18 (!) 146/93   10/24/18 (!) 156/102                 Passed - Recent (12 mo) or future (30 days) visit within the authorizing provider's specialty     Patient had office visit in the last 12 months or has a visit in the next 30 days with authorizing provider or within the authorizing provider's specialty.  See \"Patient Info\" tab in inbasket, or \"Choose Columns\" in Meds & Orders section of the refill encounter.              Passed - Medication is active on med list        Passed - Patient is age 18 or older          "

## 2019-07-11 ENCOUNTER — MYC MEDICAL ADVICE (OUTPATIENT)
Dept: FAMILY MEDICINE | Facility: CLINIC | Age: 54
End: 2019-07-11

## 2019-10-23 ENCOUNTER — TELEPHONE (OUTPATIENT)
Dept: FAMILY MEDICINE | Facility: CLINIC | Age: 54
End: 2019-10-23

## 2019-10-23 DIAGNOSIS — Z12.11 SPECIAL SCREENING FOR MALIGNANT NEOPLASMS, COLON: Primary | ICD-10-CM

## 2019-12-18 ENCOUNTER — TELEPHONE (OUTPATIENT)
Dept: FAMILY MEDICINE | Facility: CLINIC | Age: 54
End: 2019-12-18

## 2020-01-30 ENCOUNTER — TELEPHONE (OUTPATIENT)
Dept: FAMILY MEDICINE | Facility: CLINIC | Age: 55
End: 2020-01-30

## 2020-04-01 DIAGNOSIS — I10 BENIGN ESSENTIAL HYPERTENSION: ICD-10-CM

## 2020-04-01 RX ORDER — AMLODIPINE BESYLATE 2.5 MG/1
2.5 TABLET ORAL DAILY
Qty: 30 TABLET | Refills: 0 | Status: SHIPPED | OUTPATIENT
Start: 2020-04-01 | End: 2020-05-04

## 2020-04-01 NOTE — TELEPHONE ENCOUNTER
"AMLODIPINE BESYLATE 2.5 MG TAB      Last Written Prescription Date:  7/5/19  Last Fill Quantity: 90,   # refills: 1  Last Office Visit: 6/6/19  Future Office visit:       Requested Prescriptions   Pending Prescriptions Disp Refills     amLODIPine (NORVASC) 2.5 MG tablet [Pharmacy Med Name: AMLODIPINE BESYLATE 2.5 MG TAB] 90 tablet 1     Sig: TAKE 1 TABLET BY MOUTH EVERY DAY       Calcium Channel Blockers Protocol  Failed - 4/1/2020  1:43 AM        Failed - Normal serum creatinine on file in past 12 months     No lab results found.    Ok to refill medication if creatinine is low          Passed - Blood pressure under 140/90 in past 12 months     BP Readings from Last 3 Encounters:   06/06/19 135/89   11/01/18 (!) 146/93   10/24/18 (!) 156/102                 Passed - Recent (12 mo) or future (30 days) visit within the authorizing provider's specialty     Patient has had an office visit with the authorizing provider or a provider within the authorizing providers department within the previous 12 mos or has a future within next 30 days. See \"Patient Info\" tab in inbasket, or \"Choose Columns\" in Meds & Orders section of the refill encounter.              Passed - Medication is active on med list        Passed - Patient is age 18 or older             "

## 2020-05-01 DIAGNOSIS — I10 BENIGN ESSENTIAL HYPERTENSION: ICD-10-CM

## 2020-05-04 RX ORDER — AMLODIPINE BESYLATE 2.5 MG/1
2.5 TABLET ORAL DAILY
Qty: 30 TABLET | Refills: 0 | Status: SHIPPED | OUTPATIENT
Start: 2020-05-04 | End: 2020-06-18

## 2020-06-18 ENCOUNTER — VIRTUAL VISIT (OUTPATIENT)
Dept: FAMILY MEDICINE | Facility: CLINIC | Age: 55
End: 2020-06-18
Payer: COMMERCIAL

## 2020-06-18 DIAGNOSIS — I10 ESSENTIAL HYPERTENSION: ICD-10-CM

## 2020-06-18 DIAGNOSIS — Z13.6 CARDIOVASCULAR SCREENING; LDL GOAL LESS THAN 130: Primary | ICD-10-CM

## 2020-06-18 DIAGNOSIS — I10 BENIGN ESSENTIAL HYPERTENSION: ICD-10-CM

## 2020-06-18 PROCEDURE — 99213 OFFICE O/P EST LOW 20 MIN: CPT | Mod: GT | Performed by: FAMILY MEDICINE

## 2020-06-18 RX ORDER — AMLODIPINE BESYLATE 2.5 MG/1
2.5 TABLET ORAL DAILY
Qty: 90 TABLET | Refills: 3 | Status: SHIPPED | OUTPATIENT
Start: 2020-06-18 | End: 2021-07-10

## 2020-06-18 NOTE — PROGRESS NOTES
"Darrel Rabago is a 54 year old male who is being evaluated via a billable video visit.      The patient has been notified of following:     \"This video visit will be conducted via a call between you and your physician/provider. We have found that certain health care needs can be provided without the need for an in-person physical exam.  This service lets us provide the care you need with a video conversation.  If a prescription is necessary we can send it directly to your pharmacy.  If lab work is needed we can place an order for that and you can then stop by our lab to have the test done at a later time.    Video visits are billed at different rates depending on your insurance coverage.  Please reach out to your insurance provider with any questions.    If during the course of the call the physician/provider feels a video visit is not appropriate, you will not be charged for this service.\"    Patient has given verbal consent for Video visit? Yes    Will anyone else be joining your video visit? No    Subjective     Chief Complaint   Patient presents with     Video Visit     [phone]     Recheck Medication       Darrel Rabago is a 54 year old male who presents today via video visit for the following health issues:    HPI     Hypertension Follow-up    Do you check your blood pressure regularly outside of the clinic? No ran out of medication 130/90    Are you following a low salt diet? No - but no added salt    Are your blood pressures ever more than 140 on the top number (systolic) OR more   than 90 on the bottom number (diastolic), for example 140/90? Yes    BP Readings from Last 6 Encounters:   06/06/19 135/89   11/01/18 (!) 146/93   10/24/18 (!) 156/102   10/10/18 (!) 173/95   10/03/18 (!) 170/101   10/02/18 150/89     He does occassionally check blood pressure he was in pain during the high ones   Has not had in 2 days     Would like to do labs at [facility] scheduling [phone].    Had to convert to " "phone visit due to technical difficulties     Patient taking medication as directed last visit was last year due for bmp and lipid screening ran out of med blood pressure slightly higher but it appears that even this small dose really works well for him   He has no side effects   No chest pain  No shortness of breath active       Patient Active Problem List   Diagnosis     CARDIOVASCULAR SCREENING; LDL GOAL LESS THAN 130     Acute bilateral low back pain with left-sided sciatica     Past Surgical History:   Procedure Laterality Date     C APPENDECTOMY  1975       Social History     Tobacco Use     Smoking status: Never Smoker     Smokeless tobacco: Never Used   Substance Use Topics     Alcohol use: Yes     Comment: 10-12 drinks per week     Family History   Problem Relation Age of Onset     Cancer Father         skin     Dermatomyositis Mother      Alzheimer Disease Maternal Grandmother      Heart Disease Maternal Grandfather      Diabetes Maternal Grandfather      Arthritis Paternal Grandmother      Alzheimer Disease Paternal Grandmother      Heart Disease Paternal Grandfather          Current Outpatient Medications   Medication Sig Dispense Refill     amLODIPine (NORVASC) 2.5 MG tablet Take 1 tablet (2.5 mg) by mouth daily 30 tablet 0       Reviewed and updated as needed this visit by Provider         Review of Systems   Constitutional, HEENT, cardiovascular, pulmonary, gi and gu systems are negative, except as otherwise noted.      Objective    There were no vitals taken for this visit.  Estimated body mass index is 26.5 kg/m  as calculated from the following:    Height as of 6/6/19: 1.797 m (5' 10.75\").    Weight as of 6/6/19: 85.6 kg (188 lb 11.2 oz).  Physical Exam     GENERAL: Healthy, alert and no distress    RESP: No audible wheeze, cough,     PSYCH: Mentation appears normal, affect normal/bright, judgement and insight intact, normal speech       Diagnostic Test Results:  Labs reviewed in Epic    due "     Assessment & Plan       ICD-10-CM    1. CARDIOVASCULAR SCREENING; LDL GOAL LESS THAN 130  Z13.6    2. Benign essential hypertension  I10 amLODIPine (NORVASC) 2.5 MG tablet   3. Essential hypertension  I10           Patient Instructions   Please get the labs and the colonoscopy done. Recheck with physical in 1 year       No follow-ups on file.    Marlys Chambers MD  [facility]      Video-Visit Details    Type of service:  Video Visit    Telephone time 6 minutes     Distant Location (provider location):  [facility]     Platform used for Video Visit: Unable to complete video visit    Return in about 1 year (around 6/18/2021) for Physical Exam, med check.       Marlys Chambers MD

## 2020-06-25 DIAGNOSIS — I10 BENIGN ESSENTIAL HYPERTENSION: ICD-10-CM

## 2020-06-25 DIAGNOSIS — Z13.6 CARDIOVASCULAR SCREENING; LDL GOAL LESS THAN 130: ICD-10-CM

## 2020-06-25 LAB
ANION GAP SERPL CALCULATED.3IONS-SCNC: 5 MMOL/L (ref 3–14)
BUN SERPL-MCNC: 22 MG/DL (ref 7–30)
CALCIUM SERPL-MCNC: 9 MG/DL (ref 8.5–10.1)
CHLORIDE SERPL-SCNC: 109 MMOL/L (ref 94–109)
CHOLEST SERPL-MCNC: 170 MG/DL
CO2 SERPL-SCNC: 26 MMOL/L (ref 20–32)
CREAT SERPL-MCNC: 0.8 MG/DL (ref 0.66–1.25)
GFR SERPL CREATININE-BSD FRML MDRD: >90 ML/MIN/{1.73_M2}
GLUCOSE SERPL-MCNC: 93 MG/DL (ref 70–99)
HDLC SERPL-MCNC: 40 MG/DL
LDLC SERPL CALC-MCNC: 110 MG/DL
NONHDLC SERPL-MCNC: 130 MG/DL
POTASSIUM SERPL-SCNC: 4 MMOL/L (ref 3.4–5.3)
SODIUM SERPL-SCNC: 140 MMOL/L (ref 133–144)
TRIGL SERPL-MCNC: 100 MG/DL

## 2020-06-25 PROCEDURE — 36415 COLL VENOUS BLD VENIPUNCTURE: CPT | Performed by: FAMILY MEDICINE

## 2020-06-25 PROCEDURE — 80061 LIPID PANEL: CPT | Performed by: FAMILY MEDICINE

## 2020-06-25 PROCEDURE — 80048 BASIC METABOLIC PNL TOTAL CA: CPT | Performed by: FAMILY MEDICINE

## 2021-01-14 ENCOUNTER — HEALTH MAINTENANCE LETTER (OUTPATIENT)
Age: 56
End: 2021-01-14

## 2021-02-15 ENCOUNTER — TELEPHONE (OUTPATIENT)
Dept: FAMILY MEDICINE | Facility: CLINIC | Age: 56
End: 2021-02-15

## 2021-02-15 NOTE — TELEPHONE ENCOUNTER
Saw patient one time virtually for med check. Has been seeing Dr. BARRETO for many years. Please remove this patient from my lists. Marlys Chambers M.D.

## 2021-02-15 NOTE — LETTER
[facility]  [address]  [place] [address]  Phone: [phone]      April 13, 2021    Darrel Rabago  [id number] [place] [address]          Dear Darrel Rabago    As part of [facility]'s commitment to health and wellness, we inform our patients when records indicate the need for specific health screening.  It is time for you to schedule the following:        - Physical      -- Colon screen. Colonoscopy or FIT test (take home test). One of these tests is recommended at age 50 to screen for colon cancer.   Please call one of the following numbers to schedule a colonoscopy:  [facility] [phone]  [facility] [phone]  [facility] [phone]  [facility] [phone] (multiple sites, call for locations)  OR....  If you prefer to do a screening that is LESS INVASIVE AND LESS EXPENSIVE there is an test for you! It is called the FIT test. It is a screening test that is done yearly and can be DONE AT HOME! Do the test at home and mail it in (you don't even have to pay for postage). If you are willing to do this test, we can order the kit for you to  at our clinic. Please call us at [phone] if you need an order for a colonoscopy or FIT testing.      To schedule an appointment with a [facility] physician, or nurse practitioner, please call:   [facility] at [phone]    NOTE:  Please disregard this notice if you have had this procedure repeated or already made an appointment.        Sincerely,      [facility] Care Team

## 2021-04-09 ENCOUNTER — IMMUNIZATION (OUTPATIENT)
Dept: NURSING | Facility: CLINIC | Age: 56
End: 2021-04-09
Payer: COMMERCIAL

## 2021-04-09 PROCEDURE — 91300 PR COVID VAC PFIZER DIL RECON 30 MCG/0.3 ML IM: CPT

## 2021-04-09 PROCEDURE — 0001A PR COVID VAC PFIZER DIL RECON 30 MCG/0.3 ML IM: CPT

## 2021-04-13 NOTE — TELEPHONE ENCOUNTER
Writer can not remove from provider's list. Writer can update HM and send reminder letter.   Africa Ivey LPN    Panel Management Review      Patient has the following on his problem list:     Hypertension   Last three blood pressure readings:  BP Readings from Last 3 Encounters:   06/06/19 135/89   11/01/18 (!) 146/93   10/24/18 (!) 156/102     Blood pressure: Passed    HTN Guidelines:  Less than 140/90      Composite cancer screening  Chart review shows that this patient is due/due soon for the following Colonoscopy  Summary:    Patient is due/failing the following:   COLONOSCOPY and PHYSICAL    Action needed:   Patient needs office visit for Physical.  Schedule colonoscopy.   Type of outreach:    Sent letter.    Questions for provider review:    None                                                                                                                                    Africa Ivey LPN       Chart routed to none .

## 2021-05-03 ENCOUNTER — IMMUNIZATION (OUTPATIENT)
Dept: NURSING | Facility: CLINIC | Age: 56
End: 2021-05-03
Attending: INTERNAL MEDICINE
Payer: COMMERCIAL

## 2021-05-03 PROCEDURE — 91300 PR COVID VAC PFIZER DIL RECON 30 MCG/0.3 ML IM: CPT

## 2021-05-03 PROCEDURE — 0002A PR COVID VAC PFIZER DIL RECON 30 MCG/0.3 ML IM: CPT

## 2021-07-09 DIAGNOSIS — I10 BENIGN ESSENTIAL HYPERTENSION: ICD-10-CM

## 2021-07-09 NOTE — TELEPHONE ENCOUNTER
"Requested Prescriptions   Pending Prescriptions Disp Refills     amLODIPine (NORVASC) 2.5 MG tablet 90 tablet 3     Sig: Take 1 tablet (2.5 mg) by mouth daily       Calcium Channel Blockers Protocol  Failed - 7/9/2021  9:08 AM        Failed - Blood pressure under 140/90 in past 12 months     BP Readings from Last 3 Encounters:   06/06/19 135/89   11/01/18 (!) 146/93   10/24/18 (!) 156/102                 Failed - Recent (12 mo) or future (30 days) visit within the authorizing provider's specialty     Patient has had an office visit with the authorizing provider or a provider within the authorizing providers department within the previous 12 mos or has a future within next 30 days. See \"Patient Info\" tab in inbasket, or \"Choose Columns\" in Meds & Orders section of the refill encounter.              Failed - Normal serum creatinine on file in past 12 months     Recent Labs   Lab Test 06/25/20  1050   CR 0.80       Ok to refill medication if creatinine is low          Passed - Medication is active on med list        Passed - Patient is age 18 or older             "

## 2021-07-10 RX ORDER — AMLODIPINE BESYLATE 2.5 MG/1
2.5 TABLET ORAL DAILY
Qty: 90 TABLET | Refills: 3 | Status: SHIPPED | OUTPATIENT
Start: 2021-07-10 | End: 2022-08-04

## 2021-10-24 ENCOUNTER — HEALTH MAINTENANCE LETTER (OUTPATIENT)
Age: 56
End: 2021-10-24

## 2021-12-22 ENCOUNTER — MYC MEDICAL ADVICE (OUTPATIENT)
Dept: FAMILY MEDICINE | Facility: CLINIC | Age: 56
End: 2021-12-22
Payer: COMMERCIAL

## 2021-12-22 DIAGNOSIS — U07.1 INFECTION DUE TO 2019 NOVEL CORONAVIRUS: Primary | ICD-10-CM

## 2022-02-13 ENCOUNTER — HEALTH MAINTENANCE LETTER (OUTPATIENT)
Age: 57
End: 2022-02-13

## 2022-07-13 NOTE — PROGRESS NOTES
Darrel Rabago  :  1965  DOS: 2022  MRN: [id number]    Sports Medicine Clinic Visit    PCP: Wilber Dupree    Darrel Rabago is a 56 year old adult who is seen as a self referral presenting with left hip pain that radiates into the groin area. He states he was here 3 years ago, but was evaluated for low back pain.      Injury: Gradual onset of pain over the past few months. He states over the last couple of years he had similar pain to when his back was causing issue. Occasionally the pain radiates to the groin and travels down to the knee medially.  He states his VMO has never fully recovered.   Pain located over left hip, anterior, deep, pubic sympysis, radiating to groin.  Reports intermittent radiating, pain and weakness, stiffness.  Additional Features:  Positive: swelling, grinding, weakness and stiffness.  Symptoms are better with Ice and Aleve.  Symptoms are worse with: flexion, sitting, standing and walking but all intermittent, no consistency in pain.  Other evaluation and/or treatments so far consists of: Ice, Aleve and strengthening, exercises, core gym, biking.  Recent imaging completed: MRI completed 10/18/18.  Prior History of related problems: patient has a hx of low back and lumbar pain in 2018, was evaluated by me.     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    Past Medical History:   Diagnosis Date     NO ACTIVE PROBLEMS      Past Surgical History:   Procedure Laterality Date     ZZC APPENDECTOMY  1975     Family History   Problem Relation Age of Onset     Cancer Father         skin     Dermatomyositis Mother      Alzheimer Disease Maternal Grandmother      Heart Disease Maternal Grandfather      Diabetes Maternal Grandfather      Arthritis Paternal  "Grandmother      Alzheimer Disease Paternal Grandmother      Heart Disease Paternal Grandfather        Objective  /85   Ht 1.803 m (5' 11\")   BMI 26.32 kg/m        General: healthy, alert and in no distress      HEENT: no scleral icterus or conjunctival erythema     Skin: no suspicious lesions or rash. No jaundice.     CV: regular rhythm by palpation, 2+ distal pulses, no pedal edema      Resp: normal respiratory effort without conversational dyspnea     Psych: normal mood and affect      Gait: nonantalgic, appropriate coordination and balance     Neuro: normal light touch sensory exam of the extremities. Motor strength as noted below     Left hip exam    Inspection:        no edema or ecchymosis in hip area    ROM:       Full active and passive ROM       Mild pain with terminal flexion, milder in terminal abduction and ER passively    Strength:        flexion 5/5       extension 5/5       abduction 5/5       adduction 5/5       Ongoing mild deconditioning left leg compared to right, ongoing VMO atrophy but overall stabilizes pelvis and lower leg much better than prior visit    Tender:        greater trochanter very mild       Very mild inguinal crease, no palpable hernia    Non Tender:        remainder of hip area       illiac crest       ASIS       SI joint    Sensation:        grossly intact in hip and thigh    Skin:       well perfused       capillary refill brisk    Special Tests:        neg (-) LENNY       positive (+) FADIR       neg (-) scour       neg (-) Loreto       Neg log roll       Neg SLR and slump    Radiology  Recent Results (from the past 744 hour(s))   XR Pelvis w Hip LT 1 View    Narrative    XR PELVIS AND HIP LEFT 1 VIEW 7/14/2022 8:39 AM    HISTORY: Left hip pain    COMPARISON: None.    FINDINGS: No fracture. No degenerative changes in the left hip. Mild  degenerative changes in the right hip.    ELISEO FREITAS MD         SYSTEM ID:  [id number]       Assessment:  1. Left hip pain  "   2. Primary osteoarthritis of left hip    3. Left leg weakness        Plan:  Discussed the assessment with the patient.  Follow up: prn based on clinical progress  Increasing left hip pain consistent on exam with hip joint pain  Known chronic left leg weakness from hx of lumbar compression/radiculopathy, has worked diligently on this but still some residual weakness present, will continue with HEP  Additional HEP for hip flexibility provided, also order for formal PT for hip stabilization HEP fine-tuning  Trial of diagnostic and hopefully therapeutic CSI to left hip joint today  XR images independently visualized and reviewed with patient today in clinic  No substantial hip joint DJD noted on XR, mild changes only, but this hip is more prone to irritation due to underlying left sided weakness  Low impact activity strategies reviewed, consider Pilates  Expectations and goals of CSI reviewed  Often 2-3 days for steroid effect, and can take up to two weeks for maximum effect  We discussed modified progressive pain-free activity as tolerated  Do not overuse in first two weeks if feeling better due to concern for vulnerability while steroid is working  Supportive care reviewed  All questions were answered today  Contact us with additional questions or concerns  Signs and sx of concern reviewed      Karl Najera DO, MICHAEL  Sports Medicine Physician  [facility]            Disclaimer: This note consists of symbols derived from keyboarding, dictation and/or voice recognition software. As a result, there may be errors in the script that have gone undetected. Please consider this when interpreting information found in this chart.    Large Joint Injection/Arthocentesis: L hip joint    Date/Time: 7/14/2022 9:31 AM  Performed by: Karl Najera DO  Authorized by: Karl Najera DO     Indications:  Pain and osteoarthritis  Needle Size:  22 G  Guidance: ultrasound     Approach:  Anterior  Location:  Hip      Site:  L hip joint  Medications:  4 mL lidocaine 1 %; 2 mL ropivacaine 5 MG/ML; 40 mg triamcinolone 40 MG/ML  Outcome:  Tolerated well, no immediate complications  Procedure discussed: discussed risks, benefits, and alternatives    Consent Given by:  Patient  Timeout: timeout called immediately prior to procedure    Prep: patient was prepped and draped in usual sterile fashion

## 2022-07-14 ENCOUNTER — OFFICE VISIT (OUTPATIENT)
Dept: ORTHOPEDICS | Facility: CLINIC | Age: 57
End: 2022-07-14
Payer: COMMERCIAL

## 2022-07-14 VITALS — BODY MASS INDEX: 26.32 KG/M2 | HEIGHT: 71 IN | SYSTOLIC BLOOD PRESSURE: 135 MMHG | DIASTOLIC BLOOD PRESSURE: 85 MMHG

## 2022-07-14 DIAGNOSIS — M16.12 PRIMARY OSTEOARTHRITIS OF LEFT HIP: ICD-10-CM

## 2022-07-14 DIAGNOSIS — R29.898 LEFT LEG WEAKNESS: ICD-10-CM

## 2022-07-14 DIAGNOSIS — M25.552 LEFT HIP PAIN: Primary | ICD-10-CM

## 2022-07-14 PROCEDURE — 99203 OFFICE O/P NEW LOW 30 MIN: CPT | Mod: 25 | Performed by: FAMILY MEDICINE

## 2022-07-14 PROCEDURE — 20611 DRAIN/INJ JOINT/BURSA W/US: CPT | Mod: LT | Performed by: FAMILY MEDICINE

## 2022-07-14 RX ORDER — ROPIVACAINE HYDROCHLORIDE 5 MG/ML
2 INJECTION, SOLUTION EPIDURAL; INFILTRATION; PERINEURAL
Status: DISCONTINUED | OUTPATIENT
Start: 2022-07-14 | End: 2024-05-01

## 2022-07-14 RX ORDER — LIDOCAINE HYDROCHLORIDE 10 MG/ML
4 INJECTION, SOLUTION INFILTRATION; PERINEURAL
Status: DISCONTINUED | OUTPATIENT
Start: 2022-07-14 | End: 2024-05-01

## 2022-07-14 RX ORDER — TRIAMCINOLONE ACETONIDE 40 MG/ML
40 INJECTION, SUSPENSION INTRA-ARTICULAR; INTRAMUSCULAR
Status: DISCONTINUED | OUTPATIENT
Start: 2022-07-14 | End: 2024-05-01

## 2022-07-14 RX ADMIN — TRIAMCINOLONE ACETONIDE 40 MG: 40 INJECTION, SUSPENSION INTRA-ARTICULAR; INTRAMUSCULAR at 09:31

## 2022-07-14 RX ADMIN — ROPIVACAINE HYDROCHLORIDE 2 ML: 5 INJECTION, SOLUTION EPIDURAL; INFILTRATION; PERINEURAL at 09:31

## 2022-07-14 RX ADMIN — LIDOCAINE HYDROCHLORIDE 4 ML: 10 INJECTION, SOLUTION INFILTRATION; PERINEURAL at 09:31

## 2022-07-14 NOTE — LETTER
2022         RE: Darrel Rabago  [address] [facility] [address]        Dear Colleague,    Thank you for referring your patient, Darrel Rabago, to the [facility]. Please see a copy of my visit note below.    Darrel Rabago  :  1965  DOS: 2022  MRN: [id number]    Sports Medicine Clinic Visit    PCP: Wilber Dupree    Darrel Rabago is a 56 year old adult who is seen as a self referral presenting with left hip pain that radiates into the groin area. He states he was here 3 years ago, but was evaluated for low back pain.      Injury: Gradual onset of pain over the past few months. He states over the last couple of years he had similar pain to when his back was causing issue. Occasionally the pain radiates to the groin and travels down to the knee medially.  He states his VMO has never fully recovered.   Pain located over left hip, anterior, deep, pubic sympysis, radiating to groin.  Reports intermittent radiating, pain and weakness, stiffness.  Additional Features:  Positive: swelling, grinding, weakness and stiffness.  Symptoms are better with Ice and Aleve.  Symptoms are worse with: flexion, sitting, standing and walking but all intermittent, no consistency in pain.  Other evaluation and/or treatments so far consists of: Ice, Aleve and strengthening, exercises, core gym, biking.  Recent imaging completed: MRI completed 10/18/18.  Prior History of related problems: patient has a hx of low back and lumbar pain in 2018, was evaluated by me.     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    Past Medical History:   Diagnosis Date     NO ACTIVE PROBLEMS      Past Surgical History:   Procedure Laterality Date     ZZC APPENDECTOMY   "1975     Family History   Problem Relation Age of Onset     Cancer Father         skin     Dermatomyositis Mother      Alzheimer Disease Maternal Grandmother      Heart Disease Maternal Grandfather      Diabetes Maternal Grandfather      Arthritis Paternal Grandmother      Alzheimer Disease Paternal Grandmother      Heart Disease Paternal Grandfather        Objective  /85   Ht 1.803 m (5' 11\")   BMI 26.32 kg/m        General: healthy, alert and in no distress      HEENT: no scleral icterus or conjunctival erythema     Skin: no suspicious lesions or rash. No jaundice.     CV: regular rhythm by palpation, 2+ distal pulses, no pedal edema      Resp: normal respiratory effort without conversational dyspnea     Psych: normal mood and affect      Gait: nonantalgic, appropriate coordination and balance     Neuro: normal light touch sensory exam of the extremities. Motor strength as noted below     Left hip exam    Inspection:        no edema or ecchymosis in hip area    ROM:       Full active and passive ROM       Mild pain with terminal flexion, milder in terminal abduction and ER passively    Strength:        flexion 5/5       extension 5/5       abduction 5/5       adduction 5/5       Ongoing mild deconditioning left leg compared to right, ongoing VMO atrophy but overall stabilizes pelvis and lower leg much better than prior visit    Tender:        greater trochanter very mild       Very mild inguinal crease, no palpable hernia    Non Tender:        remainder of hip area       illiac crest       ASIS       SI joint    Sensation:        grossly intact in hip and thigh    Skin:       well perfused       capillary refill brisk    Special Tests:        neg (-) LENNY       positive (+) FADIR       neg (-) scour       neg (-) Loreto       Neg log roll       Neg SLR and slump    Radiology  Recent Results (from the past 744 hour(s))   XR Pelvis w Hip LT 1 View    Narrative    XR PELVIS AND HIP LEFT 1 VIEW 7/14/2022 8:39 " AM    HISTORY: Left hip pain    COMPARISON: None.    FINDINGS: No fracture. No degenerative changes in the left hip. Mild  degenerative changes in the right hip.    ELISEO FREITAS MD         SYSTEM ID:  [id number]       Assessment:  1. Left hip pain    2. Primary osteoarthritis of left hip    3. Left leg weakness        Plan:  Discussed the assessment with the patient.  Follow up: prn based on clinical progress  Increasing left hip pain consistent on exam with hip joint pain  Known chronic left leg weakness from hx of lumbar compression/radiculopathy, has worked diligently on this but still some residual weakness present, will continue with HEP  Additional HEP for hip flexibility provided, also order for formal PT for hip stabilization HEP fine-tuning  Trial of diagnostic and hopefully therapeutic CSI to left hip joint today  XR images independently visualized and reviewed with patient today in clinic  No substantial hip joint DJD noted on XR, mild changes only, but this hip is more prone to irritation due to underlying left sided weakness  Low impact activity strategies reviewed, consider Pilates  Expectations and goals of CSI reviewed  Often 2-3 days for steroid effect, and can take up to two weeks for maximum effect  We discussed modified progressive pain-free activity as tolerated  Do not overuse in first two weeks if feeling better due to concern for vulnerability while steroid is working  Supportive care reviewed  All questions were answered today  Contact us with additional questions or concerns  Signs and sx of concern reviewed      Karl Najera DO, MICHAEL  Sports Medicine Physician  [facility]            Disclaimer: This note consists of symbols derived from keyboarding, dictation and/or voice recognition software. As a result, there may be errors in the script that have gone undetected. Please consider this when interpreting information found in this chart.    Large  Joint Injection/Arthocentesis: L hip joint    Date/Time: 7/14/2022 9:31 AM  Performed by: Karl Najera DO  Authorized by: Karl Najera DO     Indications:  Pain and osteoarthritis  Needle Size:  22 G  Guidance: ultrasound    Approach:  Anterior  Location:  Hip      Site:  L hip joint  Medications:  4 mL lidocaine 1 %; 2 mL ropivacaine 5 MG/ML; 40 mg triamcinolone 40 MG/ML  Outcome:  Tolerated well, no immediate complications  Procedure discussed: discussed risks, benefits, and alternatives    Consent Given by:  Patient  Timeout: timeout called immediately prior to procedure    Prep: patient was prepped and draped in usual sterile fashion                Again, thank you for allowing me to participate in the care of your patient.        Sincerely,        Karl Najera DO

## 2022-08-03 DIAGNOSIS — I10 BENIGN ESSENTIAL HYPERTENSION: ICD-10-CM

## 2022-08-03 NOTE — TELEPHONE ENCOUNTER
Routing refill request to provider for review/approval because:  Patient needs to be seen because it has been more than 1 year since last office visit.    Silver Thao RN

## 2022-08-04 RX ORDER — AMLODIPINE BESYLATE 2.5 MG/1
2.5 TABLET ORAL DAILY
Qty: 30 TABLET | Refills: 0 | Status: SHIPPED | OUTPATIENT
Start: 2022-08-04 | End: 2022-08-29

## 2022-08-29 DIAGNOSIS — I10 BENIGN ESSENTIAL HYPERTENSION: ICD-10-CM

## 2022-08-29 RX ORDER — AMLODIPINE BESYLATE 2.5 MG/1
2.5 TABLET ORAL DAILY
Qty: 30 TABLET | Refills: 0 | Status: SHIPPED | OUTPATIENT
Start: 2022-08-29 | End: 2022-10-03

## 2022-09-30 DIAGNOSIS — I10 BENIGN ESSENTIAL HYPERTENSION: ICD-10-CM

## 2022-10-03 RX ORDER — AMLODIPINE BESYLATE 2.5 MG/1
2.5 TABLET ORAL DAILY
Qty: 30 TABLET | Refills: 0 | Status: SHIPPED | OUTPATIENT
Start: 2022-10-03 | End: 2022-10-12

## 2022-10-12 ENCOUNTER — OFFICE VISIT (OUTPATIENT)
Dept: FAMILY MEDICINE | Facility: CLINIC | Age: 57
End: 2022-10-12
Payer: COMMERCIAL

## 2022-10-12 VITALS
RESPIRATION RATE: 14 BRPM | HEIGHT: 71 IN | TEMPERATURE: 97.5 F | OXYGEN SATURATION: 98 % | DIASTOLIC BLOOD PRESSURE: 86 MMHG | SYSTOLIC BLOOD PRESSURE: 138 MMHG | HEART RATE: 78 BPM | WEIGHT: 195.2 LBS | BODY MASS INDEX: 27.33 KG/M2

## 2022-10-12 DIAGNOSIS — Z79.899 ENCOUNTER FOR LONG-TERM (CURRENT) USE OF MEDICATIONS: ICD-10-CM

## 2022-10-12 DIAGNOSIS — Z00.00 ROUTINE GENERAL MEDICAL EXAMINATION AT A HEALTH CARE FACILITY: ICD-10-CM

## 2022-10-12 DIAGNOSIS — I10 BENIGN ESSENTIAL HYPERTENSION: ICD-10-CM

## 2022-10-12 DIAGNOSIS — Z13.6 CARDIOVASCULAR SCREENING; LDL GOAL LESS THAN 130: Primary | ICD-10-CM

## 2022-10-12 DIAGNOSIS — Z12.11 SCREEN FOR COLON CANCER: ICD-10-CM

## 2022-10-12 DIAGNOSIS — Z11.59 NEED FOR HEPATITIS C SCREENING TEST: ICD-10-CM

## 2022-10-12 LAB
CHOLEST SERPL-MCNC: 265 MG/DL
HDLC SERPL-MCNC: 48 MG/DL
LDLC SERPL CALC-MCNC: 194 MG/DL
NONHDLC SERPL-MCNC: 217 MG/DL
TRIGL SERPL-MCNC: 116 MG/DL

## 2022-10-12 PROCEDURE — 86803 HEPATITIS C AB TEST: CPT | Performed by: FAMILY MEDICINE

## 2022-10-12 PROCEDURE — 36415 COLL VENOUS BLD VENIPUNCTURE: CPT | Performed by: FAMILY MEDICINE

## 2022-10-12 PROCEDURE — 99396 PREV VISIT EST AGE 40-64: CPT | Performed by: FAMILY MEDICINE

## 2022-10-12 PROCEDURE — 80061 LIPID PANEL: CPT | Performed by: FAMILY MEDICINE

## 2022-10-12 RX ORDER — LISINOPRIL 10 MG/1
10 TABLET ORAL DAILY
Qty: 90 TABLET | Refills: 1 | Status: SHIPPED | OUTPATIENT
Start: 2022-10-12 | End: 2023-04-24

## 2022-10-12 ASSESSMENT — ENCOUNTER SYMPTOMS
HEMATURIA: 0
NERVOUS/ANXIOUS: 0
DYSURIA: 0
DIZZINESS: 0
ARTHRALGIAS: 0
DIARRHEA: 0
HEARTBURN: 0
JOINT SWELLING: 0
SORE THROAT: 0
PALPITATIONS: 0
NAUSEA: 0
PARESTHESIAS: 0
CHILLS: 0
SHORTNESS OF BREATH: 0
ABDOMINAL PAIN: 0
EYE PAIN: 0
HEMATOCHEZIA: 0
HEADACHES: 0
FREQUENCY: 0
CONSTIPATION: 0
MYALGIAS: 0
COUGH: 0
FEVER: 0
WEAKNESS: 0

## 2022-10-12 ASSESSMENT — PAIN SCALES - GENERAL: PAINLEVEL: NO PAIN (0)

## 2022-10-12 NOTE — PROGRESS NOTES
SUBJECTIVE:   CC: Darrel is an 56 year old who presents for preventative health visit.     Patient has been advised of split billing requirements and indicates understanding: Yes     Healthy Habits:     Getting at least 3 servings of Calcium per day:  Yes    Bi-annual eye exam:  Yes    Dental care twice a year:  Yes    Sleep apnea or symptoms of sleep apnea:  Excessive snoring    Diet:  Breakfast skipped and Other    Frequency of exercise:  4-5 days/week    Duration of exercise:  15-30 minutes    Taking medications regularly:  Yes    Medication side effects:  None    PHQ-2 Total Score: 0    Additional concerns today:  Yes    -He has been noticing some rosacea on his face, and he is thinking it might of started when he started the BP medication.    Answers for HPI/ROS submitted by the patient on 10/12/2022  Blood in stool: No  heartburn: No  peripheral edema: No  mood changes: No  Skin sensation changes: No  impotence: No  penile discharge: No            Today's PHQ-2 Score:   PHQ-2 ( 1999 [employer]) 10/12/2022   Q1: Little interest or pleasure in doing things 0   Q2: Feeling down, depressed or hopeless 0   PHQ-2 Score 0   PHQ-2 Total Score (12-17 Years)- Positive if 3 or more points; Administer PHQ-A if positive -   Q1: Little interest or pleasure in doing things Not at all   Q2: Feeling down, depressed or hopeless Not at all   PHQ-2 Score 0       Abuse: Current or Past(Physical, Sexual or Emotional)- No  Do you feel safe in your environment? Yes    Have you ever done Advance Care Planning? (For example, a Health Directive, POLST, or a discussion with a medical provider or your loved ones about your wishes): No, advance care planning information given to patient to review.  Patient declined advance care planning discussion at this time.    Social History     Tobacco Use     Smoking status: Never     Smokeless tobacco: Never   Substance Use Topics     Alcohol use: Yes     Comment: 10-12 drinks per week     If you drink  alcohol do you typically have >3 drinks per day or >7 drinks per week? Yes      Alcohol Use 10/12/2022   Prescreen: >3 drinks/day or >7 drinks/week? Yes   Prescreen: >3 drinks/day or >7 drinks/week? -   AUDIT SCORE  4     AUDIT - Alcohol Use Disorders Identification Test - Reproduced from the World Health Organization Audit 2001 (Second Edition) 10/12/2022   1.  How often do you have a drink containing alcohol? 2 to 3 times a week   2.  How many drinks containing alcohol do you have on a typical day when you are drinking? 1 or 2   3.  How often do you have five or more drinks on one occasion? Less than monthly   4.  How often during the last year have you found that you were not able to stop drinking once you had started? Never   5.  How often during the last year have you failed to do what was normally expected of you because of drinking? Never   6.  How often during the last year have you needed a first drink in the morning to get yourself going after a heavy drinking session? Never   7.  How often during the last year have you had a feeling of guilt or remorse after drinking? Never   8.  How often during the last year have you been unable to remember what happened the night before because of your drinking? Never   9.  Have you or someone else been injured because of your drinking? No   10. Has a relative, friend, doctor or other health care worker been concerned about your drinking or suggested you cut down? No   TOTAL SCORE 4       Last PSA: No results found for: PSA    Reviewed orders with patient. Reviewed health maintenance and updated orders accordingly - Yes  BP Readings from Last 3 Encounters:   10/12/22 138/86   07/14/22 135/85   06/06/19 135/89    Wt Readings from Last 3 Encounters:   10/12/22 88.5 kg (195 lb 3.2 oz)   06/06/19 85.6 kg (188 lb 11.2 oz)   10/24/18 85.3 kg (188 lb 1.6 oz)                  Patient Active Problem List   Diagnosis     CARDIOVASCULAR SCREENING; LDL GOAL LESS THAN 130     Acute  bilateral low back pain with left-sided sciatica     Essential hypertension     Past Surgical History:   Procedure Laterality Date     ZZC APPENDECTOMY  1975       Social History     Tobacco Use     Smoking status: Never     Smokeless tobacco: Never   Substance Use Topics     Alcohol use: Yes     Comment: 10-12 drinks per week     Family History   Problem Relation Age of Onset     Cancer Father         skin     Dermatomyositis Mother      Alzheimer Disease Maternal Grandmother      Heart Disease Maternal Grandfather      Diabetes Maternal Grandfather      Arthritis Paternal Grandmother      Alzheimer Disease Paternal Grandmother      Heart Disease Paternal Grandfather          Current Outpatient Medications   Medication Sig Dispense Refill     lisinopril (ZESTRIL) 10 MG tablet Take 1 tablet (10 mg) by mouth daily 90 tablet 1     No Known Allergies    Reviewed and updated as needed this visit by clinical staff   Tobacco  Allergies  Meds   Med Hx  Surg Hx  Fam Hx  Soc Hx        Reviewed and updated as needed this visit by Provider                   Review of Systems   Constitutional: Negative for chills and fever.   HENT: Negative for congestion, ear pain, hearing loss and sore throat.    Eyes: Negative for pain and visual disturbance.   Respiratory: Negative for cough and shortness of breath.    Cardiovascular: Negative for chest pain and palpitations.   Gastrointestinal: Negative for abdominal pain, constipation, diarrhea and nausea.   Genitourinary: Negative for dysuria, frequency, genital sores, hematuria and urgency.   Musculoskeletal: Negative for arthralgias, joint swelling and myalgias.   Skin: Negative for rash.   Neurological: Negative for dizziness, weakness and headaches.   Psychiatric/Behavioral: The patient is not nervous/anxious.      CONSTITUTIONAL: NEGATIVE for fever, chills, change in weight  INTEGUMENTARY/SKIN: NEGATIVE for worrisome rashes, moles or lesions  EYES: NEGATIVE for vision changes  "or irritation  ENT: NEGATIVE for ear, mouth and throat problems  RESP: NEGATIVE for significant cough or SOB  CV: NEGATIVE for chest pain, palpitations or peripheral edema  GI: NEGATIVE for nausea, abdominal pain, heartburn, or change in bowel habits   male: negative for dysuria, hematuria, decreased urinary stream, erectile dysfunction, urethral discharge  MUSCULOSKELETAL: NEGATIVE for significant arthralgias or myalgia  NEURO: NEGATIVE for weakness, dizziness or paresthesias  PSYCHIATRIC: NEGATIVE for changes in mood or affect    OBJECTIVE:   /86   Pulse 78   Temp 97.5  F (36.4  C) (Tympanic)   Resp 14   Ht 1.797 m (5' 10.75\")   Wt 88.5 kg (195 lb 3.2 oz)   SpO2 98%   BMI 27.42 kg/m      Physical Exam  GENERAL: healthy, alert and no distress  NECK: no adenopathy, no asymmetry, masses, or scars and thyroid normal to palpation  RESP: lungs clear to auscultation - no rales, rhonchi or wheezes  CV: regular rate and rhythm, normal S1 S2, no S3 or S4, no murmur, click or rub, no peripheral edema and peripheral pulses strong  ABDOMEN: soft, nontender, no hepatosplenomegaly, no masses and bowel sounds normal  MS: no gross musculoskeletal defects noted, no edema    Diagnostic Test Results:  Labs reviewed in Epic    ASSESSMENT/PLAN:       ICD-10-CM    1. CARDIOVASCULAR SCREENING; LDL GOAL LESS THAN 130  Z13.6 Lipid panel reflex to direct LDL Fasting     Lipid panel reflex to direct LDL Fasting     Lipid panel reflex to direct LDL Fasting      2. Routine general medical examination at a health care facility  Z00.00       3. Encounter for long-term (current) use of medications  Z79.899       4. Screen for colon cancer  Z12.11 Colonoscopy Screening  Referral     Colonoscopy Screening  Referral      5. Need for hepatitis C screening test  Z11.59 Hepatitis C Screen Reflex to HCV RNA Quant and Genotype     Hepatitis C Screen Reflex to HCV RNA Quant and Genotype      6. Benign essential " "hypertension  I10 lisinopril (ZESTRIL) 10 MG tablet          Patient has been advised of split billing requirements and indicates understanding: Yes    COUNSELING:   Reviewed preventive health counseling, as reflected in patient instructions       Consider AAA screening for ages 65-75 and > 100 cig smoking history or family history of AAA       Regular exercise       Healthy diet/nutrition       Vision screening       Hearing screening       Colorectal cancer screening       Prostate cancer screening    Estimated body mass index is 27.42 kg/m  as calculated from the following:    Height as of this encounter: 1.797 m (5' 10.75\").    Weight as of this encounter: 88.5 kg (195 lb 3.2 oz).     Weight management plan: Discussed healthy diet and exercise guidelines    Darrel Rabago reports that Darrel Rabago has never smoked. Darrel Rabago has never used smokeless tobacco.      Counseling Resources:  ATP IV Guidelines  Pooled Cohorts Equation Calculator  FRAX Risk Assessment  ICSI Preventive Guidelines  Dietary Guidelines for Americans, 2010  USDA's MyPlate  ASA Prophylaxis  Lung CA Screening    Wilber Dupree MD  [facility]  "

## 2022-10-13 LAB — HCV AB SERPL QL IA: NONREACTIVE

## 2022-10-15 ENCOUNTER — HEALTH MAINTENANCE LETTER (OUTPATIENT)
Age: 57
End: 2022-10-15

## 2023-04-24 DIAGNOSIS — I10 BENIGN ESSENTIAL HYPERTENSION: ICD-10-CM

## 2023-04-24 RX ORDER — LISINOPRIL 10 MG/1
10 TABLET ORAL DAILY
Qty: 90 TABLET | Refills: 1 | Status: SHIPPED | OUTPATIENT
Start: 2023-04-24 | End: 2023-10-16

## 2023-04-24 NOTE — TELEPHONE ENCOUNTER
Routing refill request to provider for review/approval because:  Labs not current:  BMP due    Silver Thao RN

## 2023-09-11 NOTE — PROGRESS NOTES
Darrel Rabago  :  1965  DOS: 2023  MRN: [id number]    Sports Medicine Clinic Visit    PCP: Wilber Dupree    Darrel Rabago is a 56 year old adult who is seen as a self referral presenting with left hip pain that radiates into the groin area. He states he was here 3 years ago, but was evaluated for low back pain.      Injury: Gradual onset of pain over the past few months. He states over the last couple of years he had similar pain to when his back was causing issue. Occasionally the pain radiates to the groin and travels down to the knee medially.  He states his VMO has never fully recovered.   Pain located over left hip, anterior, deep, pubic sympysis, radiating to groin.  Reports intermittent radiating, pain and weakness, stiffness.  Additional Features:  Positive: swelling, grinding, weakness and stiffness.  Symptoms are better with Ice and Aleve.  Symptoms are worse with: flexion, sitting, standing and walking but all intermittent, no consistency in pain.  Other evaluation and/or treatments so far consists of: Ice, Aleve and strengthening, exercises, core gym, biking.  Recent imaging completed: MRI completed 10/18/18.  Prior History of related problems: patient has a hx of low back and lumbar pain in 2018, was evaluated by me.     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 Did lots of strength training and stretching which helped. Increase in pain in the last 2 months. More pain as the day goes on or with prolonged standing. Also c/o pain when squatting, or external/internal rotation, after exertion. Currently using ice, heat, ibuprofen, aleve PRN. No interim injury.  Looking to get repeat injection today and future steps.     Review of Systems  Musculoskeletal: as above  Remainder of review of systems is negative including  constitutional, CV, pulmonary, GI, Skin and Neurologic except as noted in HPI or medical history.    Past Medical History:   Diagnosis Date    NO ACTIVE PROBLEMS      Past Surgical History:   Procedure Laterality Date    ZZC APPENDECTOMY  1975     Family History   Problem Relation Age of Onset    Cancer Father         skin    Dermatomyositis Mother     Alzheimer Disease Maternal Grandmother     Heart Disease Maternal Grandfather     Diabetes Maternal Grandfather     Arthritis Paternal Grandmother     Alzheimer Disease Paternal Grandmother     Heart Disease Paternal Grandfather        Objective  BP (!) 154/89   Pulse 64   Wt 88.5 kg (195 lb)   BMI 27.39 kg/m      General: healthy, alert and in no distress    HEENT: no scleral icterus or conjunctival erythema   Skin: no suspicious lesions or rash. No jaundice.   CV: regular rhythm by palpation, 2+ distal pulses, no pedal edema    Resp: normal respiratory effort without conversational dyspnea   Psych: normal mood and affect    Gait: nonantalgic, appropriate coordination and balance   Neuro: normal light touch sensory exam of the extremities. Motor strength as noted below     Left hip exam    Inspection:        no edema or ecchymosis in hip area    ROM:       Full active and passive ROM       Mild pain with terminal flexion, milder in terminal abduction and ER passively    Strength:        flexion 5/5       extension 5/5       abduction 5/5       adduction 5/5       Ongoing mild deconditioning left leg compared to right, ongoing VMO atrophy but overall stabilizes pelvis and lower leg reasonably well    Tender:        greater trochanter very mild       Very mild inguinal crease    Non Tender:        remainder of hip area       illiac crest       ASIS       SI joint    Sensation:        grossly intact in hip and thigh    Skin:       well perfused       capillary refill brisk    Special Tests:        neg (-) LENNY       positive (+) FADIR       neg (-) scour       neg  (-) Loreto       Painful log roll       Neg SLR and slump    Radiology  Recent Results (from the past 744 hour(s))   XR Pelvis w Hip LT 1 View    Narrative    XR PELVIS AND HIP LEFT 1 VIEW 7/14/2022 8:39 AM    HISTORY: Left hip pain    COMPARISON: None.    FINDINGS: No fracture. No degenerative changes in the left hip. Mild  degenerative changes in the right hip.    ELISEO FREITAS MD         SYSTEM ID:  [id number]       Assessment:  1. Chronic left hip pain    2. Weakness of left leg    3. Primary osteoarthritis of left hip        Plan:  Discussed the assessment with the patient.  Follow up: prn based on clinical progress  Increasing left hip pain consistent on exam with hip joint pain  Known chronic left leg weakness from hx of lumbar compression/radiculopathy, has worked diligently on this but still some residual weakness present, will continue with HEP  New PT order placed for hip stabilization HEP fine-tuning, with BFR approach, will see Rufino Asuma  Repeat US guided CSI to left hip joint today  XR images independently visualized and reviewed with patient today in clinic  Can consider MRI in the future if needed  Low impact activity strategies reviewed  Expectations and goals of CSI reviewed  Often 2-3 days for steroid effect, and can take up to two weeks for maximum effect  We discussed modified progressive pain-free activity as tolerated  Do not overuse in first two weeks if feeling better due to concern for vulnerability while steroid is working  Supportive care reviewed  All questions were answered today  Contact us with additional questions or concerns  Signs and sx of concern reviewed      Karl Najera DO, MICHAEL  Sports Medicine Physician  [facility]            Disclaimer: This note consists of symbols derived from keyboarding, dictation and/or voice recognition software. As a result, there may be errors in the script that have gone undetected. Please consider this  when interpreting information found in this chart.    Large Joint Injection/Arthocentesis: L hip joint    Date/Time: 9/13/2023 10:35 AM    Performed by: Karl Najera DO  Authorized by: Karl Najera DO    Indications:  Pain and diagnostic evaluation  Needle Size:  22 G  Guidance: ultrasound    Approach:  Anterior  Location:  Hip      Site:  L hip joint  Medications:  40 mg triamcinolone 40 MG/ML; 3 mL ROPivacaine 5 MG/ML  Outcome:  Tolerated well, no immediate complications  Procedure discussed: discussed risks, benefits, and alternatives    Consent Given by:  Patient  Timeout: timeout called immediately prior to procedure    Prep: patient was prepped and draped in usual sterile fashion     4 ml's of 1% lidocaine was used as local anesthetic prior to injection    Ultrasound images of procedure were permanently stored.

## 2023-09-12 ENCOUNTER — PATIENT OUTREACH (OUTPATIENT)
Dept: CARE COORDINATION | Facility: CLINIC | Age: 58
End: 2023-09-12
Payer: COMMERCIAL

## 2023-09-13 ENCOUNTER — OFFICE VISIT (OUTPATIENT)
Dept: ORTHOPEDICS | Facility: CLINIC | Age: 58
End: 2023-09-13
Payer: COMMERCIAL

## 2023-09-13 VITALS
HEART RATE: 64 BPM | SYSTOLIC BLOOD PRESSURE: 154 MMHG | BODY MASS INDEX: 27.39 KG/M2 | DIASTOLIC BLOOD PRESSURE: 89 MMHG | WEIGHT: 195 LBS

## 2023-09-13 DIAGNOSIS — G89.29 CHRONIC LEFT HIP PAIN: Primary | ICD-10-CM

## 2023-09-13 DIAGNOSIS — M25.552 CHRONIC LEFT HIP PAIN: Primary | ICD-10-CM

## 2023-09-13 DIAGNOSIS — R29.898 WEAKNESS OF LEFT LEG: ICD-10-CM

## 2023-09-13 DIAGNOSIS — M16.12 PRIMARY OSTEOARTHRITIS OF LEFT HIP: ICD-10-CM

## 2023-09-13 PROCEDURE — 20611 DRAIN/INJ JOINT/BURSA W/US: CPT | Mod: LT | Performed by: FAMILY MEDICINE

## 2023-09-13 RX ORDER — ROPIVACAINE HYDROCHLORIDE 5 MG/ML
3 INJECTION, SOLUTION EPIDURAL; INFILTRATION; PERINEURAL
Status: DISCONTINUED | OUTPATIENT
Start: 2023-09-13 | End: 2024-05-01

## 2023-09-13 RX ORDER — TRIAMCINOLONE ACETONIDE 40 MG/ML
40 INJECTION, SUSPENSION INTRA-ARTICULAR; INTRAMUSCULAR
Status: DISCONTINUED | OUTPATIENT
Start: 2023-09-13 | End: 2024-05-01

## 2023-09-13 RX ADMIN — TRIAMCINOLONE ACETONIDE 40 MG: 40 INJECTION, SUSPENSION INTRA-ARTICULAR; INTRAMUSCULAR at 10:35

## 2023-09-13 RX ADMIN — ROPIVACAINE HYDROCHLORIDE 3 ML: 5 INJECTION, SOLUTION EPIDURAL; INFILTRATION; PERINEURAL at 10:35

## 2023-09-13 NOTE — LETTER
2023         RE: Darrel Rabago  [address] [facility] [address]        Dear Colleague,    Thank you for referring your patient, Darrel Rabago, to the [facility]. Please see a copy of my visit note below.    Darrel Rabago  :  1965  DOS: 2023  MRN: [id number]    Sports Medicine Clinic Visit    PCP: Wilber Dupree    Darrel Rabago is a 56 year old adult who is seen as a self referral presenting with left hip pain that radiates into the groin area. He states he was here 3 years ago, but was evaluated for low back pain.      Injury: Gradual onset of pain over the past few months. He states over the last couple of years he had similar pain to when his back was causing issue. Occasionally the pain radiates to the groin and travels down to the knee medially.  He states his VMO has never fully recovered.   Pain located over left hip, anterior, deep, pubic sympysis, radiating to groin.  Reports intermittent radiating, pain and weakness, stiffness.  Additional Features:  Positive: swelling, grinding, weakness and stiffness.  Symptoms are better with Ice and Aleve.  Symptoms are worse with: flexion, sitting, standing and walking but all intermittent, no consistency in pain.  Other evaluation and/or treatments so far consists of: Ice, Aleve and strengthening, exercises, core gym, biking.  Recent imaging completed: MRI completed 10/18/18.  Prior History of related problems: patient has a hx of low back and lumbar pain in 2018, was evaluated by me.     Social History: currently employed as working at a [employer] at out and about    Interim History - 2023  Since last visit on 22 patient states he felt relief post injection for about 3-4 months. Did lots of strength training and stretching which helped. Increase in pain in the last 2 months. More pain as the day goes on or with prolonged standing. Also c/o pain when squatting, or external/internal  rotation, after exertion. Currently using ice, heat, ibuprofen, aleve PRN. No interim injury.  Looking to get repeat injection today and future steps.     Review of Systems  Musculoskeletal: as above  Remainder of review of systems is negative including constitutional, CV, pulmonary, GI, Skin and Neurologic except as noted in HPI or medical history.    Past Medical History:   Diagnosis Date     NO ACTIVE PROBLEMS      Past Surgical History:   Procedure Laterality Date     ZZC APPENDECTOMY  1975     Family History   Problem Relation Age of Onset     Cancer Father         skin     Dermatomyositis Mother      Alzheimer Disease Maternal Grandmother      Heart Disease Maternal Grandfather      Diabetes Maternal Grandfather      Arthritis Paternal Grandmother      Alzheimer Disease Paternal Grandmother      Heart Disease Paternal Grandfather        Objective  BP (!) 154/89   Pulse 64   Wt 88.5 kg (195 lb)   BMI 27.39 kg/m      General: healthy, alert and in no distress    HEENT: no scleral icterus or conjunctival erythema   Skin: no suspicious lesions or rash. No jaundice.   CV: regular rhythm by palpation, 2+ distal pulses, no pedal edema    Resp: normal respiratory effort without conversational dyspnea   Psych: normal mood and affect    Gait: nonantalgic, appropriate coordination and balance   Neuro: normal light touch sensory exam of the extremities. Motor strength as noted below     Left hip exam    Inspection:        no edema or ecchymosis in hip area    ROM:       Full active and passive ROM       Mild pain with terminal flexion, milder in terminal abduction and ER passively    Strength:        flexion 5/5       extension 5/5       abduction 5/5       adduction 5/5       Ongoing mild deconditioning left leg compared to right, ongoing VMO atrophy but overall stabilizes pelvis and lower leg reasonably well    Tender:        greater trochanter very mild       Very mild inguinal crease    Non Tender:        remainder  of hip area       illiac crest       ASIS       SI joint    Sensation:        grossly intact in hip and thigh    Skin:       well perfused       capillary refill brisk    Special Tests:        neg (-) LENNY       positive (+) FADIR       neg (-) scour       neg (-) Loreto       Painful log roll       Neg SLR and slump    Radiology  Recent Results (from the past 744 hour(s))   XR Pelvis w Hip LT 1 View    Narrative    XR PELVIS AND HIP LEFT 1 VIEW 7/14/2022 8:39 AM    HISTORY: Left hip pain    COMPARISON: None.    FINDINGS: No fracture. No degenerative changes in the left hip. Mild  degenerative changes in the right hip.    ELISEO FREITAS MD         SYSTEM ID:  [id number]       Assessment:  1. Chronic left hip pain    2. Weakness of left leg    3. Primary osteoarthritis of left hip        Plan:  Discussed the assessment with the patient.  Follow up: prn based on clinical progress  Increasing left hip pain consistent on exam with hip joint pain  Known chronic left leg weakness from hx of lumbar compression/radiculopathy, has worked diligently on this but still some residual weakness present, will continue with HEP  New PT order placed for hip stabilization HEP fine-tuning, with BFR approach, will see Rufino Asuma  Repeat US guided CSI to left hip joint today  XR images independently visualized and reviewed with patient today in clinic  Can consider MRI in the future if needed  Low impact activity strategies reviewed  Expectations and goals of CSI reviewed  Often 2-3 days for steroid effect, and can take up to two weeks for maximum effect  We discussed modified progressive pain-free activity as tolerated  Do not overuse in first two weeks if feeling better due to concern for vulnerability while steroid is working  Supportive care reviewed  All questions were answered today  Contact us with additional questions or concerns  Signs and sx of concern reviewed      Karl Najera DO, CAQ  Sports Medicine Physician  [facility]  [facility]            Disclaimer: This note consists of symbols derived from keyboarding, dictation and/or voice recognition software. As a result, there may be errors in the script that have gone undetected. Please consider this when interpreting information found in this chart.    Large Joint Injection/Arthocentesis: L hip joint    Date/Time: 9/13/2023 10:35 AM    Performed by: Karl Najera DO  Authorized by: Karl Najera DO    Indications:  Pain and diagnostic evaluation  Needle Size:  22 G  Guidance: ultrasound    Approach:  Anterior  Location:  Hip      Site:  L hip joint  Medications:  40 mg triamcinolone 40 MG/ML; 3 mL ROPivacaine 5 MG/ML  Outcome:  Tolerated well, no immediate complications  Procedure discussed: discussed risks, benefits, and alternatives    Consent Given by:  Patient  Timeout: timeout called immediately prior to procedure    Prep: patient was prepped and draped in usual sterile fashion     4 ml's of 1% lidocaine was used as local anesthetic prior to injection    Ultrasound images of procedure were permanently stored.             Again, thank you for allowing me to participate in the care of your patient.        Sincerely,        Karl Najera DO

## 2023-09-21 ENCOUNTER — THERAPY VISIT (OUTPATIENT)
Dept: PHYSICAL THERAPY | Facility: CLINIC | Age: 58
End: 2023-09-21
Attending: FAMILY MEDICINE
Payer: COMMERCIAL

## 2023-09-21 DIAGNOSIS — M16.12 PRIMARY OSTEOARTHRITIS OF LEFT HIP: ICD-10-CM

## 2023-09-21 DIAGNOSIS — M25.552 CHRONIC LEFT HIP PAIN: ICD-10-CM

## 2023-09-21 DIAGNOSIS — R29.898 WEAKNESS OF LEFT LEG: ICD-10-CM

## 2023-09-21 DIAGNOSIS — G89.29 CHRONIC LEFT HIP PAIN: ICD-10-CM

## 2023-09-21 DIAGNOSIS — M25.552 HIP PAIN, LEFT: Primary | ICD-10-CM

## 2023-09-21 PROCEDURE — 97161 PT EVAL LOW COMPLEX 20 MIN: CPT | Mod: GP | Performed by: PHYSICAL THERAPIST

## 2023-09-21 PROCEDURE — 97110 THERAPEUTIC EXERCISES: CPT | Mod: GP | Performed by: PHYSICAL THERAPIST

## 2023-09-21 ASSESSMENT — ACTIVITIES OF DAILY LIVING (ADL)
HOS_ADL_HIGHEST_POTENTIAL_SCORE: 64
WALKING_APPROXIMATELY_10_MINUTES: NO DIFFICULTY AT ALL
WALKING_UP_STEEP_HILLS: SLIGHT DIFFICULTY
STANDING FOR 15 MINUTES: SLIGHT DIFFICULTY
TWISTING/PIVOTING ON INVOLVED LEG: SLIGHT DIFFICULTY
ROLLING OVER IN BED: SLIGHT DIFFICULTY
WALKING_DOWN_STEEP_HILLS: NO DIFFICULTY AT ALL
GETTING INTO AND OUT OF AN AVERAGE CAR: NO DIFFICULTY AT ALL
RECREATIONAL ACTIVITIES: MODERATE DIFFICULTY
PUTTING ON SOCKS AND SHOES: SLIGHT DIFFICULTY
HOS_ADL_SCORE(%): 78.13
GOING DOWN 1 FLIGHT OF STAIRS: SLIGHT DIFFICULTY
STEPPING UP AND DOWN CURBS: NO DIFFICULTY AT ALL
HEAVY_WORK: MODERATE DIFFICULTY
SITTING FOR 15 MINUTES: NO DIFFICULTY AT ALL
LIGHT_TO_MODERATE_WORK: SLIGHT DIFFICULTY
DEEP SQUATTING: EXTREME DIFFICULTY
WALKING_INITIALLY: NO DIFFICULTY AT ALL
HOS_ADL_ITEM_SCORE_TOTAL: 50
GOING UP 1 FLIGHT OF STAIRS: NO DIFFICULTY AT ALL
WALKING_15_MINUTES_OR_GREATER: SLIGHT DIFFICULTY
HOW_WOULD_YOU_RATE_YOUR_CURRENT_LEVEL_OF_FUNCTION_DURING_YOUR_USUAL_ACTIVITIES_OF_DAILY_LIVING_FROM_0_TO_100_WITH_100_BEING_YOUR_LEVEL_OF_FUNCTION_PRIOR_TO_YOUR_HIP_PROBLEM_AND_0_BEING_THE_INABILITY_TO_PERFORM_ANY_OF_YOUR_USUAL_DAILY_ACTIVITIES?: 60

## 2023-09-21 NOTE — PROGRESS NOTES
PHYSICAL THERAPY EVALUATION  Type of Visit: Evaluation    See electronic medical record for Abuse and Falls Screening details.    Subjective       Presenting condition or subjective complaint: Weakness in left hip and leg, periods of pain and stiffness after initial activity in left hip (especially standing and walking after an initial activity)  Date of onset: 09/13/23 (MD ORDER DATE)    Relevant medical history: High blood pressure   Dates & types of surgery:      Prior diagnostic imaging/testing results: MRI; X-ray     Prior therapy history for the same diagnosis, illness or injury: Yes [facility] PT and aftercare gym ([facility]) with good results until this summer.  PT at [facility] that was less intense and ineffective.    Prior Level of Function  Transfers: Independent  Ambulation: Independent  ADL: Independent  IADL: Driving, Work, Yard work    Living Environment  Social support: With a significant other or spouse   Type of home: House; 2-story   Stairs to enter the home: Yes 2 Is there a railing: Yes   Ramp: No   Stairs inside the home: Yes 2 Is there a railing: Yes   Help at home: Home and Yard maintenance tasks  Equipment owned:       Employment: Yes Management - Sales  Hobbies/Interests: Golf, hiking. cooking, gym, birding    Patient goals for therapy: Deep squat, stand and walk without pain, improve power in my golf swing (I play or pratice 3-4 days per week).    Pain assessment: Pain present     Objective   HIP EVALUATION  PAIN: Pain Level at Rest: 1/10  Pain Level with Use: 5/10  Pain Location: hip  Pain Quality: Aching and Sharp  Pain Frequency: intermittent  Pain is Worst: daytime  Pain is Exacerbated By: prolonged sitting or standing, walking long distances, squatting  Pain is Relieved By: cold, NSAIDs, and rest  Pain Progression: Improved since injection on 9/13/23  INTEGUMENTARY (edema, incisions):   POSTURE:  slouches to avoid increased hip flexion  GAIT:   Weightbearing  Status: WBAT  Assistive Device(s): None  Gait Deviations:  mild antalgia on left LE  BALANCE/PROPRIOCEPTION:  symmetrical stability with eyes open  WEIGHTBEARING ALIGNMENT:   NON-WEIGHTBEARING ALIGNMENT:    ROM:  Left hip passive flexion limited to 100 vs 120 on right.  Left hip passive IR limited to 10-15 on left vs 20-25 on right.  All other planes grossly WNL    PELVIC/SI SCREEN:   STRENGTH:   Pain: - none + mild ++ moderate +++ severe  Strength Scale: 0-5/5 Left Right   Hip Flexion 4/5 5/5   Hip Extension 4+/5 5/5   Hip Abduction 4+/5 5/5   Hip External Rotation 4+/5 5/5     LE FLEXIBILITY:   SPECIAL TESTS:    Left Right   LENNY - -   FADIR/Labrum/JOSELUIS +/- +   Femoral Nerve - -   Loreto's - -   Piriformis - -   FUNCTIONAL TESTS: Double Leg Squat: good understanding of technique but modified to avoid deep hip flexion on left  Single Leg Squat: Hip internal rotation and Contralateral hip drop  PALPATION: WNL  JOINT MOBILITY:     Assessment & Plan   CLINICAL IMPRESSIONS  Medical Diagnosis: Left hip pain    Treatment Diagnosis: Left hip pain   Impression/Assessment: Patient is a 57 year old male with right hip complaints.  The following significant findings have been identified: Pain, Decreased ROM/flexibility, Decreased strength, Decreased proprioception, and Decreased activity tolerance. These impairments interfere with their ability to perform self care tasks, work tasks, recreational activities, household chores, and driving  as compared to previous level of function.     Clinical Decision Making (Complexity):  Clinical Presentation: Stable/Uncomplicated  Clinical Presentation Rationale: based on medical and personal factors listed in PT evaluation  Clinical Decision Making (Complexity): Low complexity    PLAN OF CARE  Treatment Interventions:  Interventions: Manual Therapy, Neuromuscular Re-education, Therapeutic Activity, Therapeutic Exercise, Self-Care/Home Management    Long Term Goals     PT Goal 1  Goal  Identifier: Sitting  Goal Description: Patient will be able to sit >2 hours without hip pain.  Rationale: to maximize safety and independence with performance of ADLs and functional tasks;to maximize safety and independence with transportation  Goal Progress: Pain sitting 30 minutes.  Target Date: 11/30/23  PT Goal 2  Goal Identifier: Standing  Goal Description: Patient will be able to stand 2 hours without hip pain.  Rationale: to maximize safety and independence with performance of ADLs and functional tasks;to maximize safety and independence within the home;to maximize safety and independence with self cares;to maximize safety and independence within the community  Goal Progress: Pain standing 30-60 minutes  Target Date: 11/30/23      Frequency of Treatment: 1x/week  Duration of Treatment: 10 weeks    Recommended Referrals to Other Professionals:  none  Education Assessment:   Learner/Method: Patient;Demonstration;Pictures/Video    Risks and benefits of evaluation/treatment have been explained.   Patient/Family/caregiver agrees with Plan of Care.     Evaluation Time:     PT Eval, Low Complexity Minutes (06903): 18       Signing Clinician: Nikko Guzmán PT

## 2023-09-26 ENCOUNTER — PATIENT OUTREACH (OUTPATIENT)
Dept: CARE COORDINATION | Facility: CLINIC | Age: 58
End: 2023-09-26
Payer: COMMERCIAL

## 2023-10-09 ENCOUNTER — THERAPY VISIT (OUTPATIENT)
Dept: PHYSICAL THERAPY | Facility: CLINIC | Age: 58
End: 2023-10-09
Attending: FAMILY MEDICINE
Payer: COMMERCIAL

## 2023-10-09 DIAGNOSIS — M25.552 HIP PAIN, LEFT: Primary | ICD-10-CM

## 2023-10-09 PROCEDURE — 97110 THERAPEUTIC EXERCISES: CPT | Mod: GP | Performed by: PHYSICAL THERAPIST

## 2023-10-09 PROCEDURE — 97112 NEUROMUSCULAR REEDUCATION: CPT | Mod: GP | Performed by: PHYSICAL THERAPIST

## 2023-10-16 ENCOUNTER — THERAPY VISIT (OUTPATIENT)
Dept: PHYSICAL THERAPY | Facility: CLINIC | Age: 58
End: 2023-10-16
Attending: FAMILY MEDICINE
Payer: COMMERCIAL

## 2023-10-16 DIAGNOSIS — I10 BENIGN ESSENTIAL HYPERTENSION: ICD-10-CM

## 2023-10-16 DIAGNOSIS — M25.552 HIP PAIN, LEFT: Primary | ICD-10-CM

## 2023-10-16 PROCEDURE — 97112 NEUROMUSCULAR REEDUCATION: CPT | Mod: GP | Performed by: PHYSICAL THERAPIST

## 2023-10-16 PROCEDURE — 97110 THERAPEUTIC EXERCISES: CPT | Mod: GP | Performed by: PHYSICAL THERAPIST

## 2023-10-16 RX ORDER — LISINOPRIL 10 MG/1
10 TABLET ORAL DAILY
Qty: 90 TABLET | Refills: 1 | Status: SHIPPED | OUTPATIENT
Start: 2023-10-16 | End: 2024-05-02

## 2023-10-23 ENCOUNTER — THERAPY VISIT (OUTPATIENT)
Dept: PHYSICAL THERAPY | Facility: CLINIC | Age: 58
End: 2023-10-23
Attending: FAMILY MEDICINE
Payer: COMMERCIAL

## 2023-10-23 DIAGNOSIS — M25.552 HIP PAIN, LEFT: Primary | ICD-10-CM

## 2023-10-23 PROCEDURE — 97110 THERAPEUTIC EXERCISES: CPT | Mod: GP | Performed by: PHYSICAL THERAPIST

## 2023-10-23 NOTE — PROGRESS NOTES
Date  10/23/23 Limb Occlusion Pressure  153 Percent (%) Occlusion  80 Training Occlusion Pressure  122 Exercises Performed  SAQ with 2 lb x30/15, AG   X15/15    Single leg press 30 lb x30/15/15/15   Total time under tourniquet  13 min   Immediate effects  fatigue Lingering effects (from previous session)  na   NOTES: blue cuff    Blood Flow Restriction Training: Contraindications and precautions reviewed, pt safe for use of  modality, Risks and benefits discussed; pt gave informed consent

## 2023-10-30 ENCOUNTER — THERAPY VISIT (OUTPATIENT)
Dept: PHYSICAL THERAPY | Facility: CLINIC | Age: 58
End: 2023-10-30
Payer: COMMERCIAL

## 2023-10-30 DIAGNOSIS — M25.552 HIP PAIN, LEFT: Primary | ICD-10-CM

## 2023-10-30 PROCEDURE — 97110 THERAPEUTIC EXERCISES: CPT | Mod: GP | Performed by: PHYSICAL THERAPIST

## 2023-10-30 NOTE — PROGRESS NOTES
Date  10/30/23 Limb Occlusion Pressure  165 Percent (%) Occlusion  80 Training Occlusion Pressure  132 Exercises Performed  SAQ with 2 lb x30/15/15/15    Single leg press 35 lb x30/15/15/15 SH 11     Total time under tourniquet  13 min   Immediate effects  fatigue Lingering effects (from previous session)  na   NOTES: blue cuff    Blood Flow Restriction Training: Contraindications and precautions reviewed, pt safe for use of  modality, Risks and benefits discussed; pt gave informed consent

## 2023-11-13 ENCOUNTER — THERAPY VISIT (OUTPATIENT)
Dept: PHYSICAL THERAPY | Facility: CLINIC | Age: 58
End: 2023-11-13
Payer: COMMERCIAL

## 2023-11-13 DIAGNOSIS — M25.552 HIP PAIN, LEFT: Primary | ICD-10-CM

## 2023-11-13 PROCEDURE — 97110 THERAPEUTIC EXERCISES: CPT | Mod: GP | Performed by: PHYSICAL THERAPIST

## 2023-11-27 ENCOUNTER — THERAPY VISIT (OUTPATIENT)
Dept: PHYSICAL THERAPY | Facility: CLINIC | Age: 58
End: 2023-11-27
Payer: COMMERCIAL

## 2023-11-27 DIAGNOSIS — M25.552 HIP PAIN, LEFT: Primary | ICD-10-CM

## 2023-11-27 PROCEDURE — 97112 NEUROMUSCULAR REEDUCATION: CPT | Mod: GP | Performed by: PHYSICAL THERAPIST

## 2023-11-27 PROCEDURE — 97110 THERAPEUTIC EXERCISES: CPT | Mod: GP | Performed by: PHYSICAL THERAPIST

## 2023-11-27 NOTE — PROGRESS NOTES
11/27/23 0500   Appointment Info   Signing clinician's name / credentials Rufino Asuma, DPT, SCS   Total/Authorized Visits 10 per eval and treat   Visits Used 7   Medical Diagnosis Left hip pain   PT Tx Diagnosis Left hip pain   Progress Note/Certification   Onset of illness/injury or Date of Surgery 09/13/23  (MD ORDER DATE)   Therapy Frequency 1x/week   Predicted Duration 10 weeks   Progress Note Due Date 11/16/23   Progress Note Completed Date 11/27/23   PT Goal 1   Goal Identifier Sitting   Goal Description Patient will be able to sit >2 hours without hip pain.   Rationale to maximize safety and independence with performance of ADLs and functional tasks;to maximize safety and independence with transportation   Goal Progress Able to sit and drive 6 hours to [place] but has to recline seat a little bit.   Target Date 11/30/23   PT Goal 2   Goal Identifier Standing   Goal Description Patient will be able to stand 2 hours without hip pain.   Rationale to maximize safety and independence with performance of ADLs and functional tasks;to maximize safety and independence within the home;to maximize safety and independence with self cares;to maximize safety and independence within the community   Goal Progress Standing/walking >2 hours without pain, just some lateral thigh/hip soreness   Target Date 11/30/23   Subjective Report   Subjective Report Darrel reports his hip is improving overall. He notes a lot less of the sharp pains in the left hip, just some soreness that comes on with a lot of activity on his feet or with prolonged sitting.  Overall, everything is getting a lot better and he is pleased with his progress.   Objective Measures   Objective Measures Objective Measure 3;Objective Measure 4   Objective Measure 1   Objective Measure ROM   Details Left hip PROM:  flexion 120 ER 75  IR 15  extension 18   Objective Measure 2   Objective Measure Strength   Details Left hip strength:  flexion 5/5 abduction 5/5 ER 5/5  "extension 5-/5   Objective Measure 3   Objective Measure Flexibility   Details Left hamstring tightness noted, ITB length grossly WNL   Objective Measure 4   Objective Measure proprioception   Details Demonstrates mild proprioceptive deficits with dynamic single leg stance on left LE vs right LE   Treatment Interventions (PT)   Interventions Therapeutic Procedure/Exercise;Neuromuscular Re-education   Therapeutic Procedure/Exercise   Therapeutic Procedures: strength, endurance, ROM, flexibillity minutes (18154) 23   Ther Proc 1 stationary bike   Ther Proc 1 - Details 5 min SH 6 LEVEL 4   Ther Proc 5 SAQ with BFR   Ther Proc 5 - Details 2 lb x30/15/15/15   Ther Proc 6 single leg press with BFR   Ther Proc 6 - Details 30 lb x30/15/15/15   Neuromuscular Re-education   Neuromuscular re-ed of mvmt, balance, coord, kinesthetic sense, posture, proprioception minutes (14336) 12   Neuro Re-ed 1 Airplane with yellow med ball   Neuro Re-ed 1 - Details 30\" x3   Neuro Re-ed 2 single leg mini-squat with opp hip abduction iso into ball   Neuro Re-ed 2 - Details x3 sets to fatigue   Education   Learner/Method Patient;Demonstration;Pictures/Video   Plan   Home program PTRX   Plan for next session continue BFR   Total Session Time   Timed Code Treatment Minutes 35   Total Treatment Time (sum of timed and untimed services) 35         PLAN  Continue therapy per current plan of care.    Beginning/End Dates of Progress Note Reporting Period:  11/27/23 to 11/27/2023    Referring Provider:  Karl Najera    "

## 2023-11-27 NOTE — PROGRESS NOTES
Date  11/27/23 Limb Occlusion Pressure  155 Percent (%) Occlusion  80 Training Occlusion Pressure  124 Exercises Performed  SAQ with 2 lb x30/15, AG   X15/15    Single leg press 30 lb x30/15/15/15   Total time under tourniquet  13 min   Immediate effects  fatigue Lingering effects (from previous session)  na   NOTES: blue cuff    Blood Flow Restriction Training: Contraindications and precautions reviewed, pt safe for use of  modality, Risks and benefits discussed; pt gave informed consent

## 2023-12-18 ENCOUNTER — THERAPY VISIT (OUTPATIENT)
Dept: PHYSICAL THERAPY | Facility: CLINIC | Age: 58
End: 2023-12-18
Payer: COMMERCIAL

## 2023-12-18 DIAGNOSIS — M25.552 HIP PAIN, LEFT: Primary | ICD-10-CM

## 2023-12-18 PROCEDURE — 97112 NEUROMUSCULAR REEDUCATION: CPT | Mod: GP | Performed by: PHYSICAL THERAPIST

## 2023-12-18 PROCEDURE — 97110 THERAPEUTIC EXERCISES: CPT | Mod: GP | Performed by: PHYSICAL THERAPIST

## 2023-12-18 ASSESSMENT — ACTIVITIES OF DAILY LIVING (ADL)
GOING_UP_1_FLIGHT_OF_STAIRS: NO DIFFICULTY AT ALL
TWISTING/PIVOTING_ON_INVOLVED_LEG: SLIGHT DIFFICULTY
WALKING_DOWN_STEEP_HILLS: NO DIFFICULTY AT ALL
GETTING_INTO_AND_OUT_OF_A_BATHTUB: NO DIFFICULTY AT ALL
GETTING_INTO_AND_OUT_OF_AN_AVERAGE_CAR: NO DIFFICULTY AT ALL
GOING_DOWN_1_FLIGHT_OF_STAIRS: NO DIFFICULTY AT ALL
HOS_ADL_COUNT: 17
HOW_WOULD_YOU_RATE_YOUR_CURRENT_LEVEL_OF_FUNCTION_DURING_YOUR_USUAL_ACTIVITIES_OF_DAILY_LIVING_FROM_0_TO_100_WITH_100_BEING_YOUR_LEVEL_OF_FUNCTION_PRIOR_TO_YOUR_HIP_PROBLEM_AND_0_BEING_THE_INABILITY_TO_PERFORM_ANY_OF_YOUR_USUAL_DAILY_ACTIVITIES?: 90
ROLLING_OVER_IN_BED: NO DIFFICULTY AT ALL
WALKING_15_MINUTES_OR_GREATER: SLIGHT DIFFICULTY
HOS_ADL_SCORE(%): 91.18
PUTTING_ON_SOCKS_AND_SHOES: SLIGHT DIFFICULTY
LIGHT_TO_MODERATE_WORK: NO DIFFICULTY AT ALL
HEAVY_WORK: SLIGHT DIFFICULTY
WALKING_UP_STEEP_HILLS: NO DIFFICULTY AT ALL
STEPPING_UP_AND_DOWN_CURBS: NO DIFFICULTY AT ALL
SITTING_FOR_15_MINUTES: NO DIFFICULTY AT ALL
STANDING_FOR_15_MINUTES: NO DIFFICULTY AT ALL
RECREATIONAL_ACTIVITIES: SLIGHT DIFFICULTY
DEEP_SQUATTING: MODERATE DIFFICULTY
HOS_ADL_HIGHEST_POTENTIAL_SCORE: 68
WALKING_INITIALLY: NO DIFFICULTY AT ALL
HOS_ADL_ITEM_SCORE_TOTAL: 62
WALKING_APPROXIMATELY_10_MINUTES: NO DIFFICULTY AT ALL

## 2023-12-18 NOTE — PROGRESS NOTES
12/18/23 0500   Appointment Info   Signing clinician's name / credentials Rufino Asuma, DPT, SCS   Total/Authorized Visits 10 per eval and treat   Visits Used 7   Medical Diagnosis Left hip pain   PT Tx Diagnosis Left hip pain   Progress Note/Certification   Onset of illness/injury or Date of Surgery 09/13/23  (MD ORDER DATE)   Therapy Frequency 1x/week   Predicted Duration 10 weeks   Progress Note Due Date 11/16/23   Progress Note Completed Date 11/27/23   PT Goal 1   Goal Identifier Sitting   Goal Description Patient will be able to sit >2 hours without hip pain.   Rationale to maximize safety and independence with performance of ADLs and functional tasks;to maximize safety and independence with transportation   Goal Progress Sitting total of 12 hours with driving and flying without much issue   Target Date 11/30/23   PT Goal 2   Goal Identifier Standing   Goal Description Patient will be able to stand 2 hours without hip pain.   Rationale to maximize safety and independence with performance of ADLs and functional tasks;to maximize safety and independence within the home;to maximize safety and independence with self cares;to maximize safety and independence within the community   Goal Progress Several hours of standing for work and was only mildly sore.   Target Date 11/30/23   Subjective Report   Subjective Report Darrel reports he was able to travel for 12 hours flying and driving without much hip soreness.  He then stood and walked for several hours with mild soreness but no sharp pains.   Objective Measure 1   Objective Measure ROM   Details Left hip PROM:  flexion 120 ER 75  IR 15  extension 18   Objective Measure 2   Objective Measure Strength   Details Left hip strength:  flexion 5/5 abduction 5/5 ER 5/5 extension 5-/5   Objective Measure 3   Objective Measure Flexibility   Details Left hamstring tightness noted, ITB length grossly WNL   Objective Measure 4   Objective Measure proprioception   Details  "Demonstrates mild proprioceptive deficits with dynamic single leg stance on left LE vs right LE   Treatment Interventions (PT)   Interventions Therapeutic Procedure/Exercise;Neuromuscular Re-education   Therapeutic Procedure/Exercise   Therapeutic Procedures: strength, endurance, ROM, flexibillity minutes (11259) 23   Ther Proc 1 stationary bike   Ther Proc 1 - Details 5 min SH 6 LEVEL 4   Ther Proc 5 SAQ with BFR   Ther Proc 5 - Details 2 lb x30/15/15/15   Ther Proc 6 single leg press with BFR   Ther Proc 6 - Details 35 lb x30/15/15/15   Neuromuscular Re-education   Neuromuscular re-ed of mvmt, balance, coord, kinesthetic sense, posture, proprioception minutes (28029) 12   Neuro Re-ed 1 Airplane with blue med ball   Neuro Re-ed 1 - Details 30\" x3   Neuro Re-ed 2 single leg mini-squat with opp hip abduction iso into ball   Neuro Re-ed 2 - Details x3 sets to fatigue   Education   Learner/Method Patient;Demonstration;Pictures/Video   Plan   Home program PTRX   Updates to plan of care continue independently PRN   Total Session Time   Timed Code Treatment Minutes 35   Total Treatment Time (sum of timed and untimed services) 35         PLAN  Darrel will continue his HEP independently and contact PT if his symptoms worsen over the next 2-3 months.  He will be discharged if he has no problems during that time.    Beginning/End Dates of Progress Note Reporting Period:  11/27/23 to 12/18/2023    Referring Provider:  Karl Najera    "

## 2023-12-18 NOTE — PROGRESS NOTES
Date  12/18/23 Limb Occlusion Pressure  170 Percent (%) Occlusion  80 Training Occlusion Pressure  136 Exercises Performed  SAQ with 2 lb x30/15  /15/15    Single leg press 35 lb x30/15/15/15   Total time under tourniquet  13 min   Immediate effects  fatigue Lingering effects (from previous session)  na   NOTES: blue cuff    Blood Flow Restriction Training: Contraindications and precautions reviewed, pt safe for use of  modality, Risks and benefits discussed; pt gave informed consent

## 2024-01-07 ENCOUNTER — HEALTH MAINTENANCE LETTER (OUTPATIENT)
Age: 59
End: 2024-01-07

## 2024-03-27 NOTE — LETTER
[facility]  [address]  [facility] [address]  [phone]        October 29, 2019    Darrel Rabago  [address] [facility] [address]              Dear Darrel Rabago    This is to remind you that your LAB is due.    You may call our office at [phone] to schedule an appointment.    Please disregard this notice if you have already had your labs drawn or made an appointment.        Sincerely,        Wilber Dupree MD           Home

## 2024-04-10 PROBLEM — M25.552 HIP PAIN, LEFT: Status: RESOLVED | Noted: 2023-09-21 | Resolved: 2024-04-10

## 2024-04-10 NOTE — PROGRESS NOTES
Patient is discharged from PT.  Please refer to progress note dated 12/18/23 for last known functional status as well as final objective/subjective values.

## 2024-05-01 ENCOUNTER — OFFICE VISIT (OUTPATIENT)
Dept: ORTHOPEDICS | Facility: CLINIC | Age: 59
End: 2024-05-01
Payer: COMMERCIAL

## 2024-05-01 VITALS
HEIGHT: 71 IN | DIASTOLIC BLOOD PRESSURE: 78 MMHG | WEIGHT: 195 LBS | SYSTOLIC BLOOD PRESSURE: 143 MMHG | BODY MASS INDEX: 27.3 KG/M2

## 2024-05-01 DIAGNOSIS — R26.89 IMPAIRED GAIT AND MOBILITY: ICD-10-CM

## 2024-05-01 DIAGNOSIS — M16.12 PRIMARY OSTEOARTHRITIS OF LEFT HIP: Primary | ICD-10-CM

## 2024-05-01 DIAGNOSIS — G89.29 CHRONIC LEFT HIP PAIN: ICD-10-CM

## 2024-05-01 DIAGNOSIS — M54.50 CHRONIC LEFT-SIDED LOW BACK PAIN, UNSPECIFIED WHETHER SCIATICA PRESENT: ICD-10-CM

## 2024-05-01 DIAGNOSIS — G89.29 CHRONIC LEFT-SIDED LOW BACK PAIN, UNSPECIFIED WHETHER SCIATICA PRESENT: ICD-10-CM

## 2024-05-01 DIAGNOSIS — R29.898 WEAKNESS OF LEFT LEG: ICD-10-CM

## 2024-05-01 DIAGNOSIS — M25.552 CHRONIC LEFT HIP PAIN: ICD-10-CM

## 2024-05-01 PROCEDURE — 20611 DRAIN/INJ JOINT/BURSA W/US: CPT | Mod: LT | Performed by: FAMILY MEDICINE

## 2024-05-01 PROCEDURE — 99213 OFFICE O/P EST LOW 20 MIN: CPT | Mod: 25 | Performed by: FAMILY MEDICINE

## 2024-05-01 RX ORDER — TRIAMCINOLONE ACETONIDE 40 MG/ML
40 INJECTION, SUSPENSION INTRA-ARTICULAR; INTRAMUSCULAR
Status: SHIPPED | OUTPATIENT
Start: 2024-05-01

## 2024-05-01 RX ORDER — ROPIVACAINE HYDROCHLORIDE 5 MG/ML
3 INJECTION, SOLUTION EPIDURAL; INFILTRATION; PERINEURAL
Status: SHIPPED | OUTPATIENT
Start: 2024-05-01

## 2024-05-01 RX ADMIN — ROPIVACAINE HYDROCHLORIDE 3 ML: 5 INJECTION, SOLUTION EPIDURAL; INFILTRATION; PERINEURAL at 09:15

## 2024-05-01 RX ADMIN — TRIAMCINOLONE ACETONIDE 40 MG: 40 INJECTION, SUSPENSION INTRA-ARTICULAR; INTRAMUSCULAR at 09:15

## 2024-05-01 NOTE — LETTER
2024         RE: Darrel Rabago  [address] [facility] [place] [address]        Dear Colleague,    Thank you for referring your patient, Darrel Rabago, to the [facility]. Please see a copy of my visit note below.    Darrel Rabago  :  1965  DOS: 2024  MRN: [id number]    Sports Medicine Clinic Visit    PCP: Wilber Dupree    Darrel Rabago is a 56 year old adult who is seen as a self referral presenting with left hip pain that radiates into the groin area. He states he was here 3 years ago, but was evaluated for low back pain.      Injury: Gradual onset of pain over the past few months. He states over the last couple of years he had similar pain to when his back was causing issue. Occasionally the pain radiates to the groin and travels down to the knee medially.  He states his VMO has never fully recovered.   Pain located over left hip, anterior, deep, pubic sympysis, radiating to groin.  Reports intermittent radiating, pain and weakness, stiffness.  Additional Features:  Positive: swelling, grinding, weakness and stiffness.  Symptoms are better with Ice and Aleve.  Symptoms are worse with: flexion, sitting, standing and walking but all intermittent, no consistency in pain.  Other evaluation and/or treatments so far consists of: Ice, Aleve and strengthening, exercises, core gym, biking.  Recent imaging completed: MRI completed 10/18/18.  Prior History of related problems: patient has a hx of low back and lumbar pain in 2018, was evaluated by me.     Social History: currently employed as working at a [employer] at out and about    Interim History - 2023  Since last visit on 22 patient states he felt relief post injection for about 3-4 months. Did lots of strength training and stretching which helped. Increase in pain in the last 2 months. More pain as the day goes on or with prolonged standing. Also c/o pain when squatting, or external/internal  "rotation, after exertion. Currently using ice, heat, ibuprofen, aleve PRN. No interim injury.  Looking to get repeat injection today and future steps.     Interim History - May 1, 2024  Since last visit on 9/13/23 patient has moderate left deep anterior hip joint and left lower lumbar spine/SI joint pain over the past 4 - 6 weeks.  Patient notes that low back pain flared after lifting/twisting awkwardly performing HEP.  Left hip intra-articular injection completed on 09/13/23 provided relief for ~ 6 months.  No new injury in the interim.    Review of Systems  Musculoskeletal: as above  Remainder of review of systems is negative including constitutional, CV, pulmonary, GI, Skin and Neurologic except as noted in HPI or medical history.    Past Medical History:   Diagnosis Date     NO ACTIVE PROBLEMS      Past Surgical History:   Procedure Laterality Date     C APPENDECTOMY  1975     Family History   Problem Relation Age of Onset     Cancer Father         skin     Dermatomyositis Mother      Alzheimer Disease Maternal Grandmother      Heart Disease Maternal Grandfather      Diabetes Maternal Grandfather      Arthritis Paternal Grandmother      Alzheimer Disease Paternal Grandmother      Heart Disease Paternal Grandfather        Objective  BP (!) 143/78   Ht 1.791 m (5' 10.5\")   Wt 88.5 kg (195 lb)   BMI 27.58 kg/m      General: healthy, alert and in no distress    HEENT: no scleral icterus or conjunctival erythema   Skin: no suspicious lesions or rash. No jaundice.   CV: regular rhythm by palpation, 2+ distal pulses, no pedal edema    Resp: normal respiratory effort without conversational dyspnea   Psych: normal mood and affect    Gait: nonantalgic, appropriate coordination and balance   Neuro: normal light touch sensory exam of the extremities. Motor strength as noted below     Left hip exam    Inspection:        no edema or ecchymosis in hip area    ROM:       Full active and passive ROM       Mild pain with " terminal flexion, milder in terminal abduction and ER passively, mild in IR    Strength:        flexion 5/5       extension 5/5       abduction 5/5       adduction 5/5       Ongoing mild deconditioning left leg compared to right, ongoing VMO atrophy but overall stabilizes pelvis and lower leg reasonably well, improved compared to previous    Tender:        greater trochanter very mild    Non Tender:        remainder of hip area       illiac crest       ASIS       SI joint    Sensation:        grossly intact in hip and thigh    Skin:       well perfused       capillary refill brisk    Special Tests:        neg (-) LENNY       positive (+) FADIR       Mild pain with scour       Mildly painful log roll       Neg SLR and slump    Radiology  Recent Results (from the past 744 hour(s))   XR Pelvis w Hip LT 1 View    Narrative    XR PELVIS AND HIP LEFT 1 VIEW 7/14/2022 8:39 AM    HISTORY: Left hip pain    COMPARISON: None.    FINDINGS: No fracture. No degenerative changes in the left hip. Mild  degenerative changes in the right hip.    ELISEO FREITAS MD         SYSTEM ID:  [id number]       Assessment:  1. Primary osteoarthritis of left hip    2. Chronic left hip pain    3. Impaired gait and mobility    4. Weakness of left leg    5. Chronic left-sided low back pain, unspecified whether sciatica present        Plan:  Discussed the assessment with the patient.  Follow up: prn based on clinical progress, will reach out with MRI results  Increasing left hip pain consistent on exam with hip joint pain again today  Known chronic left leg weakness from hx of lumbar compression/radiculopathy, has worked diligently on this but still some residual weakness present, will continue with HEP  New PT order placed last visit for hip stabilization HEP fine-tuning, with BFR approach, worked with Rufino Guzmán which has been helpful  Repeat US guided CSI to left hip joint today, reviewed relative risks and limitations/goals of  treatment  Given recurrence over time in hip joint and only mild degenerative changes on XR, with pain that impacts his WB status and gait, MRI was ordered today for further diagnostic clarity especially to consider surgical options as he would like to avoid further injections where possible  Recent flare of LBP as well, known chronic issue, improving with HEP  Has been very faithful with PT and HEP for hip and leg, encouraged ongoing compliance/HEP  XR images independently visualized and reviewed with patient today in clinic  OTC pain medication options reviewed when needed  Expectations and goals of CSI reviewed  Often 2-3 days for steroid effect, and can take up to two weeks for maximum effect  We discussed modified progressive pain-free activity as tolerated  Do not overuse in first two weeks if feeling better due to concern for vulnerability while steroid is working  Supportive care reviewed  All questions were answered today  Contact us with additional questions or concerns  Signs and sx of concern reviewed      Karl Najera DO, MICHAEL  Sports Medicine Physician  [facility]            Disclaimer: This note consists of symbols derived from keyboarding, dictation and/or voice recognition software. As a result, there may be errors in the script that have gone undetected. Please consider this when interpreting information found in this chart.    Large Joint Injection/Arthocentesis: L hip joint    Date/Time: 5/1/2024 9:15 AM    Performed by: Karl Najera DO  Authorized by: Karl Najera DO    Indications:  Pain, diagnostic evaluation and osteoarthritis  Needle Size:  22 G  Guidance: ultrasound    Approach:  Anterior  Location:  Hip      Site:  L hip joint  Medications:  40 mg triamcinolone 40 MG/ML; 3 mL ROPivacaine 5 MG/ML  Outcome:  Tolerated well, no immediate complications  Procedure discussed: discussed risks, benefits, and alternatives    Consent Given  by:  Patient  Timeout: timeout called immediately prior to procedure    Prep: patient was prepped and draped in usual sterile fashion     4 ml's of 1% lidocaine was used as local anesthetic prior to injection    Ultrasound images of procedure were permanently stored.             Again, thank you for allowing me to participate in the care of your patient.        Sincerely,        Karl Najera, DO

## 2024-05-01 NOTE — PROGRESS NOTES
Darrel Rabago  :  1965  DOS: 2024  MRN: [id number]    Sports Medicine Clinic Visit    PCP: Wilber Dupree    Darrel Rabago is a 56 year old adult who is seen as a self referral presenting with left hip pain that radiates into the groin area. He states he was here 3 years ago, but was evaluated for low back pain.      Injury: Gradual onset of pain over the past few months. He states over the last couple of years he had similar pain to when his back was causing issue. Occasionally the pain radiates to the groin and travels down to the knee medially.  He states his VMO has never fully recovered.   Pain located over left hip, anterior, deep, pubic sympysis, radiating to groin.  Reports intermittent radiating, pain and weakness, stiffness.  Additional Features:  Positive: swelling, grinding, weakness and stiffness.  Symptoms are better with Ice and Aleve.  Symptoms are worse with: flexion, sitting, standing and walking but all intermittent, no consistency in pain.  Other evaluation and/or treatments so far consists of: Ice, Aleve and strengthening, exercises, core gym, biking.  Recent imaging completed: MRI completed 10/18/18.  Prior History of related problems: patient has a hx of low back and lumbar pain in 2018, was evaluated by me.     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 Did lots of strength training and stretching which helped. Increase in pain in the last 2 months. More pain as the day goes on or with prolonged standing. Also c/o pain when squatting, or external/internal rotation, after exertion. Currently using ice, heat, ibuprofen, aleve PRN. No interim injury.  Looking to get repeat injection today and future steps.     Interim History - May 1, 2024  Since last visit on 23 patient has moderate left deep anterior hip joint  "and left lower lumbar spine/SI joint pain over the past 4 - 6 weeks.  Patient notes that low back pain flared after lifting/twisting awkwardly performing HEP.  Left hip intra-articular injection completed on 09/13/23 provided relief for ~ 6 months.  No new injury in the interim.    Review of Systems  Musculoskeletal: as above  Remainder of review of systems is negative including constitutional, CV, pulmonary, GI, Skin and Neurologic except as noted in HPI or medical history.    Past Medical History:   Diagnosis Date    NO ACTIVE PROBLEMS      Past Surgical History:   Procedure Laterality Date    ZC APPENDECTOMY  1975     Family History   Problem Relation Age of Onset    Cancer Father         skin    Dermatomyositis Mother     Alzheimer Disease Maternal Grandmother     Heart Disease Maternal Grandfather     Diabetes Maternal Grandfather     Arthritis Paternal Grandmother     Alzheimer Disease Paternal Grandmother     Heart Disease Paternal Grandfather        Objective  BP (!) 143/78   Ht 1.791 m (5' 10.5\")   Wt 88.5 kg (195 lb)   BMI 27.58 kg/m      General: healthy, alert and in no distress    HEENT: no scleral icterus or conjunctival erythema   Skin: no suspicious lesions or rash. No jaundice.   CV: regular rhythm by palpation, 2+ distal pulses, no pedal edema    Resp: normal respiratory effort without conversational dyspnea   Psych: normal mood and affect    Gait: nonantalgic, appropriate coordination and balance   Neuro: normal light touch sensory exam of the extremities. Motor strength as noted below     Left hip exam    Inspection:        no edema or ecchymosis in hip area    ROM:       Full active and passive ROM       Mild pain with terminal flexion, milder in terminal abduction and ER passively, mild in IR    Strength:        flexion 5/5       extension 5/5       abduction 5/5       adduction 5/5       Ongoing mild deconditioning left leg compared to right, ongoing VMO atrophy but overall stabilizes " pelvis and lower leg reasonably well, improved compared to previous    Tender:        greater trochanter very mild    Non Tender:        remainder of hip area       illiac crest       ASIS       SI joint    Sensation:        grossly intact in hip and thigh    Skin:       well perfused       capillary refill brisk    Special Tests:        neg (-) LENNY       positive (+) FADIR       Mild pain with scour       Mildly painful log roll       Neg SLR and slump    Radiology  Recent Results (from the past 744 hour(s))   XR Pelvis w Hip LT 1 View    Narrative    XR PELVIS AND HIP LEFT 1 VIEW 7/14/2022 8:39 AM    HISTORY: Left hip pain    COMPARISON: None.    FINDINGS: No fracture. No degenerative changes in the left hip. Mild  degenerative changes in the right hip.    ELISEO FREITAS MD         SYSTEM ID:  [id number]       Assessment:  1. Primary osteoarthritis of left hip    2. Chronic left hip pain    3. Impaired gait and mobility    4. Weakness of left leg    5. Chronic left-sided low back pain, unspecified whether sciatica present        Plan:  Discussed the assessment with the patient.  Follow up: prn based on clinical progress, will reach out with MRI results  Increasing left hip pain consistent on exam with hip joint pain again today  Known chronic left leg weakness from hx of lumbar compression/radiculopathy, has worked diligently on this but still some residual weakness present, will continue with HEP  New PT order placed last visit for hip stabilization HEP fine-tuning, with BFR approach, worked with Rufino Guzmán which has been helpful  Repeat US guided CSI to left hip joint today, reviewed relative risks and limitations/goals of treatment  Given recurrence over time in hip joint and only mild degenerative changes on XR, with pain that impacts his WB status and gait, MRI was ordered today for further diagnostic clarity especially to consider surgical options as he would like to avoid further injections where  possible  Recent flare of LBP as well, known chronic issue, improving with HEP  Has been very faithful with PT and HEP for hip and leg, encouraged ongoing compliance/HEP  XR images independently visualized and reviewed with patient today in clinic  OTC pain medication options reviewed when needed  Expectations and goals of CSI reviewed  Often 2-3 days for steroid effect, and can take up to two weeks for maximum effect  We discussed modified progressive pain-free activity as tolerated  Do not overuse in first two weeks if feeling better due to concern for vulnerability while steroid is working  Supportive care reviewed  All questions were answered today  Contact us with additional questions or concerns  Signs and sx of concern reviewed      Karl Najera DO, MICHAEL  Sports Medicine Physician  [facility]            Disclaimer: This note consists of symbols derived from keyboarding, dictation and/or voice recognition software. As a result, there may be errors in the script that have gone undetected. Please consider this when interpreting information found in this chart.    Large Joint Injection/Arthocentesis: L hip joint    Date/Time: 5/1/2024 9:15 AM    Performed by: Karl Najera DO  Authorized by: Karl Najera DO    Indications:  Pain, diagnostic evaluation and osteoarthritis  Needle Size:  22 G  Guidance: ultrasound    Approach:  Anterior  Location:  Hip      Site:  L hip joint  Medications:  40 mg triamcinolone 40 MG/ML; 3 mL ROPivacaine 5 MG/ML  Outcome:  Tolerated well, no immediate complications  Procedure discussed: discussed risks, benefits, and alternatives    Consent Given by:  Patient  Timeout: timeout called immediately prior to procedure    Prep: patient was prepped and draped in usual sterile fashion     4 ml's of 1% lidocaine was used as local anesthetic prior to injection    Ultrasound images of procedure were permanently stored.

## 2024-05-02 DIAGNOSIS — I10 BENIGN ESSENTIAL HYPERTENSION: ICD-10-CM

## 2024-05-03 RX ORDER — LISINOPRIL 10 MG/1
10 TABLET ORAL DAILY
Qty: 90 TABLET | Refills: 1 | Status: SHIPPED | OUTPATIENT
Start: 2024-05-03

## 2024-10-23 DIAGNOSIS — I10 BENIGN ESSENTIAL HYPERTENSION: ICD-10-CM

## 2024-10-24 NOTE — TELEPHONE ENCOUNTER
Pending Prescriptions:                       Disp   Refills    lisinopril (ZESTRIL) 10 MG tablet         90 tab*1            Sig: Take 1 tablet (10 mg) by mouth daily.    Routing refill request to provider for review/approval because:  Most recent blood pressure under 140/90 in past 12 months- Clinicial or Patient Reported    Recent (12 mo) or future (90 days) visit within the authorizing provider's specialty    Most recent GFR on file in the past 12 months >30    Normal serum potassium on file in past 12 months     Chadd Mtz RN

## 2024-10-25 RX ORDER — LISINOPRIL 10 MG/1
10 TABLET ORAL DAILY
Qty: 90 TABLET | Refills: 1 | OUTPATIENT
Start: 2024-10-25

## 2024-11-01 ENCOUNTER — PATIENT OUTREACH (OUTPATIENT)
Dept: CARE COORDINATION | Facility: CLINIC | Age: 59
End: 2024-11-01
Payer: COMMERCIAL

## 2024-11-04 ENCOUNTER — OFFICE VISIT (OUTPATIENT)
Dept: FAMILY MEDICINE | Facility: CLINIC | Age: 59
End: 2024-11-04
Payer: COMMERCIAL

## 2024-11-04 VITALS
RESPIRATION RATE: 14 BRPM | DIASTOLIC BLOOD PRESSURE: 72 MMHG | SYSTOLIC BLOOD PRESSURE: 128 MMHG | TEMPERATURE: 97.9 F | BODY MASS INDEX: 26.78 KG/M2 | HEIGHT: 71 IN | OXYGEN SATURATION: 100 % | HEART RATE: 77 BPM | WEIGHT: 191.3 LBS

## 2024-11-04 DIAGNOSIS — E78.5 HYPERLIPIDEMIA LDL GOAL <100: ICD-10-CM

## 2024-11-04 DIAGNOSIS — I10 BENIGN ESSENTIAL HYPERTENSION: ICD-10-CM

## 2024-11-04 DIAGNOSIS — I10 ESSENTIAL HYPERTENSION: Primary | ICD-10-CM

## 2024-11-04 DIAGNOSIS — M54.42 ACUTE BILATERAL LOW BACK PAIN WITH LEFT-SIDED SCIATICA: ICD-10-CM

## 2024-11-04 DIAGNOSIS — M70.61 GREATER TROCHANTERIC BURSITIS OF RIGHT HIP: ICD-10-CM

## 2024-11-04 DIAGNOSIS — Z12.11 SCREEN FOR COLON CANCER: ICD-10-CM

## 2024-11-04 LAB
ANION GAP SERPL CALCULATED.3IONS-SCNC: 8 MMOL/L (ref 7–15)
BUN SERPL-MCNC: 13.6 MG/DL (ref 6–20)
CALCIUM SERPL-MCNC: 9.2 MG/DL (ref 8.8–10.4)
CHLORIDE SERPL-SCNC: 106 MMOL/L (ref 98–107)
CHOLEST SERPL-MCNC: 193 MG/DL
CREAT SERPL-MCNC: 0.98 MG/DL (ref 0.67–1.17)
EGFRCR SERPLBLD CKD-EPI 2021: 90 ML/MIN/1.73M2
FASTING STATUS PATIENT QL REPORTED: YES
GLUCOSE SERPL-MCNC: 105 MG/DL (ref 70–99)
GLUCOSE SERPL-MCNC: 105 MG/DL (ref 70–99)
HCO3 SERPL-SCNC: 27 MMOL/L (ref 22–29)
HDLC SERPL-MCNC: 40 MG/DL
LDLC SERPL CALC-MCNC: 127 MG/DL
NONHDLC SERPL-MCNC: 153 MG/DL
POTASSIUM SERPL-SCNC: 4.5 MMOL/L (ref 3.4–5.3)
SODIUM SERPL-SCNC: 141 MMOL/L (ref 135–145)
TRIGL SERPL-MCNC: 130 MG/DL

## 2024-11-04 PROCEDURE — 80061 LIPID PANEL: CPT | Performed by: FAMILY MEDICINE

## 2024-11-04 PROCEDURE — 80048 BASIC METABOLIC PNL TOTAL CA: CPT | Performed by: FAMILY MEDICINE

## 2024-11-04 PROCEDURE — 36415 COLL VENOUS BLD VENIPUNCTURE: CPT | Performed by: FAMILY MEDICINE

## 2024-11-04 PROCEDURE — 99214 OFFICE O/P EST MOD 30 MIN: CPT | Performed by: FAMILY MEDICINE

## 2024-11-04 RX ORDER — LISINOPRIL 10 MG/1
10 TABLET ORAL DAILY
Qty: 90 TABLET | Refills: 1 | Status: SHIPPED | OUTPATIENT
Start: 2024-11-04

## 2024-11-04 ASSESSMENT — PAIN SCALES - GENERAL: PAINLEVEL_OUTOF10: NO PAIN (0)

## 2024-11-04 NOTE — PROGRESS NOTES
"  Assessment & Plan     Essential hypertension  Good control.  Continue currant medications, continue to monitor.      Screen for colon cancer    Benign essential hypertension Good control.  Continue currant medications, continue to monitor.    (M54.42) Acute bilateral low back pain with left-sided sciatica  He asks if hip replacement would be helpful in reducing pain.  I am not sure of that. Likely both hip and back are contributing to pain.  I would suggest seeing Dr Najera again.  Discussed Easiest and most likely to be suscceful would be bursa injection.    (M70.61) Greater trochanteric bursitis of right hip      (E78.5) Hyperlipidemia LDL goal <100  Ldl was suprizinly high last visit.  He does have 2 grandfathers who had MI dad is on cholesterol meds.  If his asc risk comes back over 7.5 again he would like coronary calcium CT score.      (Z12.11) Screen for colon cancer    The 10-year ASCVD risk score (Nancy RODRIGUEZ, et al., 2019) is: 10.8%    Values used to calculate the score:      Age: 58 years      Sex: Male      Is Non- : No      Diabetic: No      Tobacco smoker: No      Systolic Blood Pressure: 128 mmHg      Is BP treated: Yes      HDL Cholesterol: 48 mg/dL      Total Cholesterol: 265 mg/dL    (M70.61) Greater trochanteric bursitis of right hip          BMI  Estimated body mass index is 27.06 kg/m  as calculated from the following:    Height as of this encounter: 1.791 m (5' 10.5\").    Weight as of this encounter: 86.8 kg (191 lb 4.8 oz).   Weight management plan: Discussed healthy diet and exercise guidelines      Ayan Rojo is a 58 year old, presenting for the following health issues:  Recheck Medication        11/4/2024     8:22 AM   Additional Questions   Roomed by Cindy Hernández CMA     History of Present Illness       Reason for visit:  Check up He is missing 1 dose(s) of medications per week.  He is not taking prescribed medications regularly due to remembering to take.   " "    Hypertension Follow-up    Do you check your blood pressure regularly outside of the clinic? Yes   Are you following a low salt diet? Yes  Are your blood pressures ever more than 140 on the top number (systolic) OR more   than 90 on the bottom number (diastolic), for example 140/90? No    How many servings of fruits and vegetables do you eat daily?  2-3  On average, how many sweetened beverages do you drink each day (Examples: soda, juice, sweet tea, etc.  Do NOT count diet or artificially sweetened beverages)?   0-1  How many days per week do you exercise enough to make your heart beat faster? 5  How many minutes a day do you exercise enough to make your heart beat faster? 30 - 60  How many days per week do you miss taking your medication? 0-1        Review of Systems  Constitutional, HEENT, cardiovascular, pulmonary, gi and gu systems are negative, except as otherwise noted.      Objective    /72 (BP Location: Right arm, Patient Position: Sitting, Cuff Size: Adult Regular)   Pulse 77   Temp 97.9  F (36.6  C) (Tympanic)   Resp 14   Ht 1.791 m (5' 10.5\")   Wt 86.8 kg (191 lb 4.8 oz)   SpO2 100%   BMI 27.06 kg/m    Body mass index is 27.06 kg/m .    Physical Exam   GENERAL: alert and no distress  NECK: no adenopathy, no asymmetry, masses, or scars  RESP: lungs clear to auscultation - no rales, rhonchi or wheezes  CV: regular rate and rhythm, normal S1 S2, no S3 or S4, no murmur, click or rub, no peripheral edema  ABDOMEN: soft, nontender, no hepatosplenomegaly, no masses and bowel sounds normal  MS: no gross musculoskeletal defects noted, no edema          Signed Electronically by: Wilber Dupree MD    "

## 2024-11-19 ENCOUNTER — TELEPHONE (OUTPATIENT)
Dept: GASTROENTEROLOGY | Facility: CLINIC | Age: 59
End: 2024-11-19
Payer: COMMERCIAL

## 2024-11-19 NOTE — TELEPHONE ENCOUNTER
Endoscopy Scheduling Screen      What insurance is in the chart?  Other:  [employer]    Ordering/Referring Provider: Wilber Dupree MD    (If ordering provider performs procedure, schedule with ordering provider unless otherwise instructed. )    BMI: There is no height or weight on file to calculate BMI.     Sedation Ordered  general anesthesia.   BMI<= 45 45 < BMI <= 48 48 < BMI < = 50  BMI > 50   No Restrictions No [facility]  No [facility]  [facility] with exceptions Hospital Only OR Only       Do you have a history of malignant hyperthermia?  No    (Females) Are you currently pregnant?   No     Are you currently on dialysis?   No    Do you need assistance transferring?   No    BMI: There is no height or weight on file to calculate BMI.     Is patients BMI > 50?  No    BMI > 40?  No    Do you have a diagnosis of diabetes?  No    Do you take an injectable medication for weight loss or diabetes (excluding insulin)?  No    Do you take the medication Naltrexone?  No    Do you take blood thinners?  No     Prep   Are you currently on dialysis or do you have chronic kidney disease?  No    Do you have a diagnosis of cystic fibrosis (CF)?  No    On a regular basis do you go 3 -5 days between bowel movements?  No    Preferred Pharmacy:  [facility]    Final Scheduling Details     Procedure scheduled  Colonoscopy    Surgeon:  Kaya      Date of procedure:  1/29/25     Location  [place] [facility] order.    What is your communication preference for Instructions and/or Bowel Prep?   Can'tWait    Patient Reminders:    You will receive a call from a Nurse to review instructions and health history.  This assessment must be completed prior to your procedure.  Failure to complete the Nurse assessment may result in the procedure being cancelled.       On the day of your procedure, please designate an adult(s) who can drive you home stay with you for the next 24 hours. The medicines used in the exam will make you sleepy. You will  not be able to drive.       You cannot take public transportation, ride share services, or non-medical taxi service without a responsible caregiver.  Medical transport services are allowed with the requirement that a responsible caregiver will receive you at your destination.  We require that drivers and caregivers are confirmed prior to your procedure.

## 2024-11-27 ENCOUNTER — ANCILLARY PROCEDURE (OUTPATIENT)
Dept: MRI IMAGING | Facility: CLINIC | Age: 59
End: 2024-11-27
Attending: FAMILY MEDICINE
Payer: COMMERCIAL

## 2024-11-27 DIAGNOSIS — G89.29 CHRONIC LEFT HIP PAIN: ICD-10-CM

## 2024-11-27 DIAGNOSIS — R26.89 IMPAIRED GAIT AND MOBILITY: ICD-10-CM

## 2024-11-27 DIAGNOSIS — M16.12 PRIMARY OSTEOARTHRITIS OF LEFT HIP: ICD-10-CM

## 2024-11-27 DIAGNOSIS — M25.552 CHRONIC LEFT HIP PAIN: ICD-10-CM

## 2024-11-27 PROCEDURE — 73721 MRI JNT OF LWR EXTRE W/O DYE: CPT | Mod: LT | Performed by: RADIOLOGY

## 2024-12-04 ENCOUNTER — OFFICE VISIT (OUTPATIENT)
Dept: ORTHOPEDICS | Facility: CLINIC | Age: 59
End: 2024-12-04
Payer: COMMERCIAL

## 2024-12-04 VITALS
SYSTOLIC BLOOD PRESSURE: 123 MMHG | DIASTOLIC BLOOD PRESSURE: 74 MMHG | BODY MASS INDEX: 26.88 KG/M2 | HEIGHT: 71 IN | WEIGHT: 192 LBS

## 2024-12-04 DIAGNOSIS — G89.29 CHRONIC LEFT HIP PAIN: ICD-10-CM

## 2024-12-04 DIAGNOSIS — M76.32 IT BAND SYNDROME, LEFT: ICD-10-CM

## 2024-12-04 DIAGNOSIS — M25.552 CHRONIC LEFT HIP PAIN: ICD-10-CM

## 2024-12-04 DIAGNOSIS — M16.12 PRIMARY OSTEOARTHRITIS OF LEFT HIP: Primary | ICD-10-CM

## 2024-12-04 DIAGNOSIS — R29.898 WEAKNESS OF LEFT LEG: ICD-10-CM

## 2024-12-04 DIAGNOSIS — M70.61 GREATER TROCHANTERIC BURSITIS OF BOTH HIPS: ICD-10-CM

## 2024-12-04 DIAGNOSIS — M70.62 GREATER TROCHANTERIC BURSITIS OF BOTH HIPS: ICD-10-CM

## 2024-12-04 PROCEDURE — 99213 OFFICE O/P EST LOW 20 MIN: CPT | Mod: 25 | Performed by: FAMILY MEDICINE

## 2024-12-04 PROCEDURE — 20610 DRAIN/INJ JOINT/BURSA W/O US: CPT | Mod: LT | Performed by: FAMILY MEDICINE

## 2024-12-04 RX ORDER — BUPIVACAINE HYDROCHLORIDE 5 MG/ML
2 INJECTION, SOLUTION PERINEURAL
Status: COMPLETED | OUTPATIENT
Start: 2024-12-04 | End: 2024-12-04

## 2024-12-04 RX ORDER — TRIAMCINOLONE ACETONIDE 40 MG/ML
40 INJECTION, SUSPENSION INTRA-ARTICULAR; INTRAMUSCULAR
Status: COMPLETED | OUTPATIENT
Start: 2024-12-04 | End: 2024-12-04

## 2024-12-04 RX ORDER — LIDOCAINE HYDROCHLORIDE 10 MG/ML
2 INJECTION, SOLUTION INFILTRATION; PERINEURAL
Status: COMPLETED | OUTPATIENT
Start: 2024-12-04 | End: 2024-12-04

## 2024-12-04 RX ADMIN — LIDOCAINE HYDROCHLORIDE 2 ML: 10 INJECTION, SOLUTION INFILTRATION; PERINEURAL at 08:45

## 2024-12-04 RX ADMIN — TRIAMCINOLONE ACETONIDE 40 MG: 40 INJECTION, SUSPENSION INTRA-ARTICULAR; INTRAMUSCULAR at 08:45

## 2024-12-04 RX ADMIN — BUPIVACAINE HYDROCHLORIDE 2 ML: 5 INJECTION, SOLUTION PERINEURAL at 08:45

## 2024-12-04 NOTE — PROGRESS NOTES
"Darrel Rabago  :  1965  DOS: 24  MRN: [id number]    Sports Medicine Clinic Visit    PCP: Wilber Dupree    Darrel Rabago is a 56 year old adult who is seen in follow up presenting with chronic left hip pain.    Interim History - 2024  Since last visit on 2024 patient has waxing & waning mild- moderate left anterior hip & moderate-severe bilateral posterior lateral hip pain.  Left hip intra-articular injection completed on 24 provided relief for ~ 6 months.  Patient notes that he has been focusing on HEP over past 1 - 2 months that is providing more relief.  Notes hip joint pain is more manageable in general, lateral hip pain is worse with prolonged standing/walking.  Currently managing lateral hip pain with ibuprofen.  Also here to further results of  left hip MRI completed on 2024.  No new injury in the interim.    Review of Systems  Musculoskeletal: as above  Remainder of review of systems is negative including constitutional, CV, pulmonary, GI, Skin and Neurologic except as noted in HPI or medical history.    Past Medical History:   Diagnosis Date    NO ACTIVE PROBLEMS      Past Surgical History:   Procedure Laterality Date    ZZC APPENDECTOMY  1975     Family History   Problem Relation Age of Onset    Cancer Father         skin    Dermatomyositis Mother     Alzheimer Disease Maternal Grandmother     Heart Disease Maternal Grandfather     Diabetes Maternal Grandfather     Arthritis Paternal Grandmother     Alzheimer Disease Paternal Grandmother     Heart Disease Paternal Grandfather        Objective  /74   Ht 1.791 m (5' 10.5\")   Wt 87.1 kg (192 lb)   BMI 27.16 kg/m      General: healthy, alert and in no distress    HEENT: no scleral icterus or conjunctival erythema   Skin: no suspicious lesions or rash. No jaundice.   CV: regular rhythm by palpation, 2+ distal pulses, no pedal edema    Resp: normal respiratory effort without conversational dyspnea   Psych: " normal mood and affect    Gait: nonantalgic, appropriate coordination and balance   Neuro: normal light touch sensory exam of the extremities. Motor strength as noted below     Left hip exam    Inspection:        no edema or ecchymosis in hip area    ROM:       Full active and passive ROM       Mild pain with terminal flexion, milder in terminal abduction and ER passively, mild in IR    Strength:        flexion 5/5       extension 5/5       abduction 5/5       adduction 5/5       Ongoing mild deconditioning left leg compared to right, but overall stabilizes pelvis and lower leg reasonably well, improved compared to previous    Tender:        greater trochanter mild right, moderate left      IT band left    Non Tender:        remainder of hip area       illiac crest       ASIS       SI joint    Sensation:        grossly intact in hip and thigh    Skin:       well perfused       capillary refill brisk    Special Tests:        neg (-) LENNY       Very mildly positive (+) FADIR, improved       minimal pain with scour       Neg SLR and slump, b/l hamstring tightness    Radiology  Results for orders placed or performed in visit on 11/27/24   MR Hip Left w/o Contrast    Narrative    MR left hip without contrast 11/27/2024 3:01 PM    Techniques: Multiplanar multisequence imaging of the left hip was  obtained without  administration of intra-articular or intravenous  contrast using routine protocol.    History: Primary osteoarthritis of left hip; Chronic left hip pain;  Chronic left hip pain; Impaired gait and mobility     Comparison: Pelvis and hip radiograph 7/14/2022    Findings:    Osseous structures  Osseous structures: Osteophytic spurring in the acetabulum with  subchondral edema. No fracture, stress reaction, avascular necrosis,  or focal osseous lesion is seen. Slightly decreased femur head neck  offset suggesting cam-type morphology also seen on prior x-ray.    Articular cartilage and labrum  Assessment limited on  this non-arthrographic study due to relative  lack of joint distension.    Articular cartilage: Subchondral edema-like signal and cystlike change  in the superior acetabulum suggesting presence of high-grade chondral  fissuring.    Labrum: Anterior superior tear and degeneration/ossification.    Ligament teres and transverse ligament of acetabulum: Intact.    Joint or bursal effusion    Joint effusion: A physiologic amount of joint fluid.    Bursal effusion: Minimal nonspecific edema over the greater  trochanter. No substantial iliopsoas or trochanteric bursal effusion.    Muscles and tendons  Muscles and tendons: Proximal hamstrings tendinosis without distinct  tear. Rectus femoris, sartorius, and iliopsoas tendons intact. The hip  abductors are intact. The visualized adductor muscles are  unremarkable.     Nerves:  The visualized course of the sciatic nerve is unremarkable.    Other Findings:  None.      Impression    Impression:    Moderate left hip osteoarthrosis.          I have personally reviewed the examination and initial interpretation  and I agree with the findings.    BERKLEY GOSS MD (Joe)         SYSTEM ID:  [id number]       Assessment:  1. Primary osteoarthritis of left hip    2. Chronic left hip pain    3. Greater trochanteric bursitis of both hips    4. It band syndrome, left    5. Weakness of left leg          Plan:  Discussed the assessment with the patient.  Follow up: prn based on clinical progress, will reach out with MRI results  Increasing lateral left hip pain consistent on exam with gluteal tendinitis/trochanteric bursitis, L>>R  Known chronic left leg weakness from hx of lumbar compression/radiculopathy, has worked diligently on this but still some residual weakness present, will continue with HEP  Repeat US guided CSI to left hip joint last visit was very helpful, reviewed relative risks and limitations/goals of treatment, defer repeat today as joint pain remains under good  control  Given recurrence over time in hip joint and only mild degenerative changes on XR, with pain that impacts his WB status and gait, MRI was ordered and revealed mild trochanteric bursitis and moderate hip joint arthritis, worse than it appeared on XR  MR and XR images independently visualized and reviewed with patient today in clinic  Trial of diagnostic and hopefully therapeutic CSI to the left GTB today, can consider on the right if needed in the future  Reviewed diagnostic and therapeutic goals of CSI, as well as relative risks  PRP and US guided percutaneous tenotomy would be options for ongoing lateral hip pain  Reviewed option for orthopedic surgery consultation for ZOE in the future as well, continue to defer for now  OTC pain medication options reviewed when needed  Expectations and goals of CSI reviewed  Often 2-3 days for steroid effect, and can take up to two weeks for maximum effect  We discussed modified progressive pain-free activity as tolerated  Do not overuse in first two weeks if feeling better due to concern for vulnerability while steroid is working  Supportive care reviewed  All questions were answered today  Contact us with additional questions or concerns  Signs and sx of concern reviewed      Karl Najera DO, MICHAEL  Sports Medicine Physician  [facility]            Disclaimer: This note consists of symbols derived from keyboarding, dictation and/or voice recognition software. As a result, there may be errors in the script that have gone undetected. Please consider this when interpreting information found in this chart.    Large Joint Injection/Arthocentesis: L greater trochanteric bursa    Date/Time: 12/4/2024 8:45 AM    Performed by: Karl Najera DO  Authorized by: Karl Najera DO    Indications:  Pain  Needle Size:  25 G  Guidance: landmark guided    Approach:  Posterolateral  Location:  Hip      Site:  L greater trochanteric  bursa  Medications:  2 mL lidocaine 1 %; 2 mL BUPivacaine 0.5 %; 40 mg triamcinolone 40 MG/ML  Outcome:  Tolerated well, no immediate complications  Procedure discussed: discussed risks, benefits, and alternatives    Consent Given by:  Patient  Timeout: timeout called immediately prior to procedure    Prep: patient was prepped and draped in usual sterile fashion

## 2024-12-04 NOTE — LETTER
"2024      Darrel Rabago  [address] [facility] [id number]      Dear Colleague,    Thank you for referring your patient, Darrel Rabago, to the [facility]. Please see a copy of my visit note below.    Darrel Rabago  :  1965  DOS: 24  MRN: [id number]    Sports Medicine Clinic Visit    PCP: Wilber Dupree    Darrel Rabago is a 56 year old adult who is seen in follow up presenting with chronic left hip pain.    Interim History - 2024  Since last visit on 2024 patient has waxing & waning mild- moderate left anterior hip & moderate-severe bilateral posterior lateral hip pain.  Left hip intra-articular injection completed on 24 provided relief for ~ 6 months.  Patient notes that he has been focusing on HEP over past 1 - 2 months that is providing more relief.  Notes hip joint pain is more manageable in general, lateral hip pain is worse with prolonged standing/walking.  Currently managing lateral hip pain with ibuprofen.  Also here to further results of  left hip MRI completed on 2024.  No new injury in the interim.    Review of Systems  Musculoskeletal: as above  Remainder of review of systems is negative including constitutional, CV, pulmonary, GI, Skin and Neurologic except as noted in HPI or medical history.    Past Medical History:   Diagnosis Date     NO ACTIVE PROBLEMS      Past Surgical History:   Procedure Laterality Date     ZZC APPENDECTOMY  1975     Family History   Problem Relation Age of Onset     Cancer Father         skin     Dermatomyositis Mother      Alzheimer Disease Maternal Grandmother      Heart Disease Maternal Grandfather      Diabetes Maternal Grandfather      Arthritis Paternal Grandmother      Alzheimer Disease Paternal Grandmother      Heart Disease Paternal Grandfather        Objective  /74   Ht 1.791 m (5' 10.5\")   Wt 87.1 kg (192 lb)   BMI 27.16 kg/m      General: healthy, alert and in no " distress    HEENT: no scleral icterus or conjunctival erythema   Skin: no suspicious lesions or rash. No jaundice.   CV: regular rhythm by palpation, 2+ distal pulses, no pedal edema    Resp: normal respiratory effort without conversational dyspnea   Psych: normal mood and affect    Gait: nonantalgic, appropriate coordination and balance   Neuro: normal light touch sensory exam of the extremities. Motor strength as noted below     Left hip exam    Inspection:        no edema or ecchymosis in hip area    ROM:       Full active and passive ROM       Mild pain with terminal flexion, milder in terminal abduction and ER passively, mild in IR    Strength:        flexion 5/5       extension 5/5       abduction 5/5       adduction 5/5       Ongoing mild deconditioning left leg compared to right, but overall stabilizes pelvis and lower leg reasonably well, improved compared to previous    Tender:        greater trochanter mild right, moderate left      IT band left    Non Tender:        remainder of hip area       illiac crest       ASIS       SI joint    Sensation:        grossly intact in hip and thigh    Skin:       well perfused       capillary refill brisk    Special Tests:        neg (-) LENNY       Very mildly positive (+) FADIR, improved       minimal pain with scour       Neg SLR and slump, b/l hamstring tightness    Radiology  Results for orders placed or performed in visit on 11/27/24   MR Hip Left w/o Contrast    Narrative    MR left hip without contrast 11/27/2024 3:01 PM    Techniques: Multiplanar multisequence imaging of the left hip was  obtained without  administration of intra-articular or intravenous  contrast using routine protocol.    History: Primary osteoarthritis of left hip; Chronic left hip pain;  Chronic left hip pain; Impaired gait and mobility     Comparison: Pelvis and hip radiograph 7/14/2022    Findings:    Osseous structures  Osseous structures: Osteophytic spurring in the acetabulum  with  subchondral edema. No fracture, stress reaction, avascular necrosis,  or focal osseous lesion is seen. Slightly decreased femur head neck  offset suggesting cam-type morphology also seen on prior x-ray.    Articular cartilage and labrum  Assessment limited on this non-arthrographic study due to relative  lack of joint distension.    Articular cartilage: Subchondral edema-like signal and cystlike change  in the superior acetabulum suggesting presence of high-grade chondral  fissuring.    Labrum: Anterior superior tear and degeneration/ossification.    Ligament teres and transverse ligament of acetabulum: Intact.    Joint or bursal effusion    Joint effusion: A physiologic amount of joint fluid.    Bursal effusion: Minimal nonspecific edema over the greater  trochanter. No substantial iliopsoas or trochanteric bursal effusion.    Muscles and tendons  Muscles and tendons: Proximal hamstrings tendinosis without distinct  tear. Rectus femoris, sartorius, and iliopsoas tendons intact. The hip  abductors are intact. The visualized adductor muscles are  unremarkable.     Nerves:  The visualized course of the sciatic nerve is unremarkable.    Other Findings:  None.      Impression    Impression:    Moderate left hip osteoarthrosis.          I have personally reviewed the examination and initial interpretation  and I agree with the findings.    BERKLEY GOSS MD (Joe)         SYSTEM ID:  [id number]       Assessment:  1. Primary osteoarthritis of left hip    2. Chronic left hip pain    3. Greater trochanteric bursitis of both hips    4. It band syndrome, left    5. Weakness of left leg          Plan:  Discussed the assessment with the patient.  Follow up: prn based on clinical progress, will reach out with MRI results  Increasing lateral left hip pain consistent on exam with gluteal tendinitis/trochanteric bursitis, L>>R  Known chronic left leg weakness from hx of lumbar compression/radiculopathy, has worked  diligently on this but still some residual weakness present, will continue with HEP  Repeat US guided CSI to left hip joint last visit was very helpful, reviewed relative risks and limitations/goals of treatment, defer repeat today as joint pain remains under good control  Given recurrence over time in hip joint and only mild degenerative changes on XR, with pain that impacts his WB status and gait, MRI was ordered and revealed mild trochanteric bursitis and moderate hip joint arthritis, worse than it appeared on XR  MR and XR images independently visualized and reviewed with patient today in clinic  Trial of diagnostic and hopefully therapeutic CSI to the left GTB today, can consider on the right if needed in the future  Reviewed diagnostic and therapeutic goals of CSI, as well as relative risks  PRP and US guided percutaneous tenotomy would be options for ongoing lateral hip pain  Reviewed option for orthopedic surgery consultation for ZOE in the future as well, continue to defer for now  OTC pain medication options reviewed when needed  Expectations and goals of CSI reviewed  Often 2-3 days for steroid effect, and can take up to two weeks for maximum effect  We discussed modified progressive pain-free activity as tolerated  Do not overuse in first two weeks if feeling better due to concern for vulnerability while steroid is working  Supportive care reviewed  All questions were answered today  Contact us with additional questions or concerns  Signs and sx of concern reviewed      Karl Najera DO, MICHAEL  Sports Medicine Physician  [facility]            Disclaimer: This note consists of symbols derived from keyboarding, dictation and/or voice recognition software. As a result, there may be errors in the script that have gone undetected. Please consider this when interpreting information found in this chart.    Large Joint Injection/Arthocentesis: L greater trochanteric  bursa    Date/Time: 12/4/2024 8:45 AM    Performed by: Karl Najera DO  Authorized by: Karl Najera DO    Indications:  Pain  Needle Size:  25 G  Guidance: landmark guided    Approach:  Posterolateral  Location:  Hip      Site:  L greater trochanteric bursa  Medications:  2 mL lidocaine 1 %; 2 mL BUPivacaine 0.5 %; 40 mg triamcinolone 40 MG/ML  Outcome:  Tolerated well, no immediate complications  Procedure discussed: discussed risks, benefits, and alternatives    Consent Given by:  Patient  Timeout: timeout called immediately prior to procedure    Prep: patient was prepped and draped in usual sterile fashion            Again, thank you for allowing me to participate in the care of your patient.        Sincerely,        Karl Najera DO

## 2025-01-25 ENCOUNTER — HEALTH MAINTENANCE LETTER (OUTPATIENT)
Age: 60
End: 2025-01-25

## 2025-01-27 ENCOUNTER — ANESTHESIA EVENT (OUTPATIENT)
Dept: GASTROENTEROLOGY | Facility: CLINIC | Age: 60
End: 2025-01-27
Payer: COMMERCIAL

## 2025-01-27 ASSESSMENT — LIFESTYLE VARIABLES: TOBACCO_USE: 0

## 2025-01-27 NOTE — ANESTHESIA PREPROCEDURE EVALUATION
"Anesthesia Pre-Procedure Evaluation    Patient: Darrel Rabago   MRN: [id number] : 1965        Procedure : Procedure(s):  Colonoscopy          Past Medical History:   Diagnosis Date    NO ACTIVE PROBLEMS       Past Surgical History:   Procedure Laterality Date    ZZC APPENDECTOMY  1975      No Known Allergies   Social History     Tobacco Use    Smoking status: Never    Smokeless tobacco: Never   Substance Use Topics    Alcohol use: Yes     Comment: 10-12 drinks per week      Wt Readings from Last 1 Encounters:   24 87.1 kg (192 lb)        Anesthesia Evaluation            ROS/MED HX  ENT/Pulmonary:    (-) tobacco use   Neurologic:       Cardiovascular:     (+)  hypertension- -   -  - -                                      METS/Exercise Tolerance:     Hematologic:       Musculoskeletal:       GI/Hepatic:     (+)        bowel prep,            Renal/Genitourinary:       Endo:       Psychiatric/Substance Use:       Infectious Disease:       Malignancy:       Other:            Physical Exam    Airway        Mallampati: I   TM distance: > 3 FB   Neck ROM: full   Mouth opening: > 3 cm    Respiratory Devices and Support         Dental       (+) Completely normal teeth      Cardiovascular   cardiovascular exam normal       Rhythm and rate: regular and normal     Pulmonary   pulmonary exam normal        breath sounds clear to auscultation           OUTSIDE LABS:  CBC: No results found for: \"WBC\", \"HGB\", \"HCT\", \"PLT\"  BMP:   Lab Results   Component Value Date     2024     2020    POTASSIUM 4.5 2024    POTASSIUM 4.0 2020    CHLORIDE 106 2024    CHLORIDE 109 2020    CO2 27 2024    CO2 26 2020    BUN 13.6 2024    BUN 22 2020    CR 0.98 2024    CR 0.80 2020     (H) 2024     (H) 2024     COAGS: No results found for: \"PTT\", \"INR\", \"FIBR\"  POC: No results found for: \"BGM\", \"HCG\", \"HCGS\"  HEPATIC: No results " "found for: \"ALBUMIN\", \"PROTTOTAL\", \"ALT\", \"AST\", \"GGT\", \"ALKPHOS\", \"BILITOTAL\", \"BILIDIRECT\", \"SHUN\"  OTHER:   Lab Results   Component Value Date    SREEDHAR 9.2 11/04/2024       Anesthesia Plan    ASA Status:  2    NPO Status:  NPO Appropriate    Anesthesia Type: General.     - Airway: Native airway   Induction: Intravenous, Propofol.   Maintenance: TIVA.        Consents    Anesthesia Plan(s) and associated risks, benefits, and realistic alternatives discussed. Questions answered and patient/representative(s) expressed understanding.     - Discussed: Risks, Benefits and Alternatives for BOTH SEDATION and the PROCEDURE were discussed     - Discussed with:  Patient            Postoperative Care       PONV prophylaxis: Background Propofol Infusion     Comments:               JOSE A Becker CRNA    I have reviewed the pertinent notes and labs in the chart from the past 30 days and (re)examined the patient.  Any updates or changes from those notes are reflected in this note.    Clinically Significant Risk Factors Present on Admission                   # Hypertension: Noted on problem list                        "

## 2025-01-29 ENCOUNTER — HOSPITAL ENCOUNTER (OUTPATIENT)
Facility: CLINIC | Age: 60
Discharge: HOME | End: 2025-01-29
Attending: SURGERY | Admitting: SURGERY
Payer: COMMERCIAL

## 2025-01-29 ENCOUNTER — ANESTHESIA (OUTPATIENT)
Dept: GASTROENTEROLOGY | Facility: CLINIC | Age: 60
End: 2025-01-29
Payer: COMMERCIAL

## 2025-01-29 VITALS
OXYGEN SATURATION: 95 % | TEMPERATURE: 97.5 F | WEIGHT: 192 LBS | BODY MASS INDEX: 27.49 KG/M2 | DIASTOLIC BLOOD PRESSURE: 84 MMHG | HEART RATE: 94 BPM | RESPIRATION RATE: 16 BRPM | HEIGHT: 70 IN | SYSTOLIC BLOOD PRESSURE: 112 MMHG

## 2025-01-29 LAB — COLONOSCOPY: NORMAL

## 2025-01-29 PROCEDURE — 258N000003 HC RX IP 258 OP 636: Performed by: SURGERY

## 2025-01-29 PROCEDURE — 250N000009 HC RX 250

## 2025-01-29 PROCEDURE — 45380 COLONOSCOPY AND BIOPSY: CPT | Mod: PT,XU

## 2025-01-29 PROCEDURE — 250N000011 HC RX IP 250 OP 636

## 2025-01-29 PROCEDURE — 250N000009 HC RX 250: Performed by: SURGERY

## 2025-01-29 PROCEDURE — 88305 TISSUE EXAM BY PATHOLOGIST: CPT | Mod: 26 | Performed by: PATHOLOGY

## 2025-01-29 PROCEDURE — 370N000017 HC ANESTHESIA TECHNICAL FEE, PER MIN: Performed by: SURGERY

## 2025-01-29 PROCEDURE — 88305 TISSUE EXAM BY PATHOLOGIST: CPT | Mod: TC | Performed by: SURGERY

## 2025-01-29 PROCEDURE — 258N000003 HC RX IP 258 OP 636

## 2025-01-29 PROCEDURE — 45385 COLONOSCOPY W/LESION REMOVAL: CPT | Performed by: SURGERY

## 2025-01-29 RX ORDER — PROPOFOL 10 MG/ML
INJECTION, EMULSION INTRAVENOUS PRN
Status: DISCONTINUED | OUTPATIENT
Start: 2025-01-29 | End: 2025-01-29

## 2025-01-29 RX ORDER — NALOXONE HYDROCHLORIDE 0.4 MG/ML
0.2 INJECTION, SOLUTION INTRAMUSCULAR; INTRAVENOUS; SUBCUTANEOUS
Status: DISCONTINUED | OUTPATIENT
Start: 2025-01-29 | End: 2025-01-29 | Stop reason: HOSPADM

## 2025-01-29 RX ORDER — SODIUM CHLORIDE, SODIUM LACTATE, POTASSIUM CHLORIDE, CALCIUM CHLORIDE 600; 310; 30; 20 MG/100ML; MG/100ML; MG/100ML; MG/100ML
INJECTION, SOLUTION INTRAVENOUS CONTINUOUS PRN
Status: DISCONTINUED | OUTPATIENT
Start: 2025-01-29 | End: 2025-01-29

## 2025-01-29 RX ORDER — NALOXONE HYDROCHLORIDE 0.4 MG/ML
0.4 INJECTION, SOLUTION INTRAMUSCULAR; INTRAVENOUS; SUBCUTANEOUS
Status: DISCONTINUED | OUTPATIENT
Start: 2025-01-29 | End: 2025-01-29 | Stop reason: HOSPADM

## 2025-01-29 RX ORDER — ONDANSETRON 2 MG/ML
4 INJECTION INTRAMUSCULAR; INTRAVENOUS
Status: DISCONTINUED | OUTPATIENT
Start: 2025-01-29 | End: 2025-01-29 | Stop reason: HOSPADM

## 2025-01-29 RX ORDER — GLYCOPYRROLATE 0.2 MG/ML
INJECTION, SOLUTION INTRAMUSCULAR; INTRAVENOUS PRN
Status: DISCONTINUED | OUTPATIENT
Start: 2025-01-29 | End: 2025-01-29

## 2025-01-29 RX ORDER — FLUMAZENIL 0.1 MG/ML
0.2 INJECTION, SOLUTION INTRAVENOUS
Status: DISCONTINUED | OUTPATIENT
Start: 2025-01-29 | End: 2025-01-29 | Stop reason: HOSPADM

## 2025-01-29 RX ORDER — LIDOCAINE HYDROCHLORIDE 20 MG/ML
INJECTION, SOLUTION INFILTRATION; PERINEURAL PRN
Status: DISCONTINUED | OUTPATIENT
Start: 2025-01-29 | End: 2025-01-29

## 2025-01-29 RX ORDER — LIDOCAINE 40 MG/G
CREAM TOPICAL
Status: DISCONTINUED | OUTPATIENT
Start: 2025-01-29 | End: 2025-01-29 | Stop reason: HOSPADM

## 2025-01-29 RX ORDER — PROPOFOL 10 MG/ML
INJECTION, EMULSION INTRAVENOUS CONTINUOUS PRN
Status: DISCONTINUED | OUTPATIENT
Start: 2025-01-29 | End: 2025-01-29

## 2025-01-29 RX ORDER — SODIUM CHLORIDE, SODIUM LACTATE, POTASSIUM CHLORIDE, CALCIUM CHLORIDE 600; 310; 30; 20 MG/100ML; MG/100ML; MG/100ML; MG/100ML
INJECTION, SOLUTION INTRAVENOUS CONTINUOUS
Status: DISCONTINUED | OUTPATIENT
Start: 2025-01-29 | End: 2025-01-29 | Stop reason: HOSPADM

## 2025-01-29 RX ADMIN — LIDOCAINE HYDROCHLORIDE 100 MG: 20 INJECTION, SOLUTION INFILTRATION; PERINEURAL at 08:32

## 2025-01-29 RX ADMIN — SODIUM CHLORIDE, POTASSIUM CHLORIDE, SODIUM LACTATE AND CALCIUM CHLORIDE: 600; 310; 30; 20 INJECTION, SOLUTION INTRAVENOUS at 08:30

## 2025-01-29 RX ADMIN — PROPOFOL 200 MCG/KG/MIN: 10 INJECTION, EMULSION INTRAVENOUS at 08:34

## 2025-01-29 RX ADMIN — PROPOFOL 100 MG: 10 INJECTION, EMULSION INTRAVENOUS at 08:33

## 2025-01-29 RX ADMIN — SODIUM CHLORIDE, POTASSIUM CHLORIDE, SODIUM LACTATE AND CALCIUM CHLORIDE 1000 ML: 600; 310; 30; 20 INJECTION, SOLUTION INTRAVENOUS at 07:53

## 2025-01-29 RX ADMIN — GLYCOPYRROLATE 0.1 MG: 0.2 INJECTION, SOLUTION INTRAMUSCULAR; INTRAVENOUS at 08:32

## 2025-01-29 RX ADMIN — LIDOCAINE HYDROCHLORIDE 0.1 ML: 10 INJECTION, SOLUTION EPIDURAL; INFILTRATION; INTRACAUDAL; PERINEURAL at 07:53

## 2025-01-29 ASSESSMENT — ACTIVITIES OF DAILY LIVING (ADL)
ADLS_ACUITY_SCORE: 41

## 2025-01-29 NOTE — ANESTHESIA POSTPROCEDURE EVALUATION
Patient: Darrel Rabago    Procedure: Procedure(s):  COLONOSCOPY, FLEXIBLE, WITH LESION REMOVAL USING SNARE       Anesthesia Type:  General    Note:  Disposition: Outpatient   Postop Pain Control: Uneventful            Sign Out: Well controlled pain   PONV: No   Neuro/Psych: Uneventful            Sign Out: Acceptable/Baseline neuro status   Airway/Respiratory: Uneventful            Sign Out: Acceptable/Baseline resp. status   CV/Hemodynamics: Uneventful            Sign Out: Acceptable CV status; No obvious hypovolemia; No obvious fluid overload   Other NRE: NONE   DID A NON-ROUTINE EVENT OCCUR? No           Last vitals:  Vitals Value Taken Time   /69 01/29/25 0900   Temp 36.4  C (97.5  F) 01/29/25 0900   Pulse 84 01/29/25 0900   Resp 14 01/29/25 0900   SpO2 94 % 01/29/25 0909   Vitals shown include unfiled device data.    Electronically Signed By: JOSE A Hirsch CRNA  January 29, 2025  9:10 AM

## 2025-01-29 NOTE — ANESTHESIA CARE TRANSFER NOTE
Patient: Darrel Rabago    Procedure: Procedure(s):  COLONOSCOPY, FLEXIBLE, WITH LESION REMOVAL USING SNARE       Diagnosis: Screen for colon cancer [Z12.11]  Diagnosis Additional Information: No value filed.    Anesthesia Type:   General     Note:    Oropharynx: oropharynx clear of all foreign objects and spontaneously breathing  Level of Consciousness: drowsy  Oxygen Supplementation: room air    Independent Airway: airway patency satisfactory and stable  Dentition: dentition unchanged  Vital Signs Stable: post-procedure vital signs reviewed and stable  Report to RN Given: handoff report given  Patient transferred to: Phase II    Handoff Report: Identifed the Patient, Identified the Reponsible Provider, Reviewed the pertinent medical history, Discussed the surgical course, Reviewed Intra-OP anesthesia mangement and issues during anesthesia, Set expectations for post-procedure period and Allowed opportunity for questions and acknowledgement of understanding      Vitals:  Vitals Value Taken Time   BP     Temp     Pulse     Resp     SpO2         Electronically Signed By: JOSE A Hirsch CRNA  January 29, 2025  9:01 AM

## 2025-01-29 NOTE — H&P
[facility]    Pre-Endoscopy History and Physical     Darrel Rabago MRN# [id number]   YOB: 1965 Age: 59 year old     Date of Procedure: 1/29/2025  Primary care provider: Wilber Dupree  Type of Endoscopy: Gastroscopy with possible biopsy, possible dilation  Reason for Procedure: Screening  Type of Anesthesia Anticipated: Conscious Sedation    HPI:    Darrel is a 59 year old male who will be undergoing the above procedure.      First colonoscopy.  Denies blood in stool or change in stool size.  No known family history of colon cancer.    A history and physical has been performed. The patient's medications and allergies have been reviewed. The risks and benefits of the procedure and the sedation options and risks were discussed with the patient.  All questions were answered and informed consent was obtained.      He denies a personal or family history of anesthesia complications or bleeding disorders.     Patient Active Problem List   Diagnosis    CARDIOVASCULAR SCREENING; LDL GOAL LESS THAN 130    Acute bilateral low back pain with left-sided sciatica    Essential hypertension        Past Medical History:   Diagnosis Date    NO ACTIVE PROBLEMS         Past Surgical History:   Procedure Laterality Date    ZC APPENDECTOMY  1975       Social History     Tobacco Use    Smoking status: Never    Smokeless tobacco: Never   Substance Use Topics    Alcohol use: Yes     Comment: 10-12 drinks per week       Family History   Problem Relation Age of Onset    Cancer Father         skin    Dermatomyositis Mother     Alzheimer Disease Maternal Grandmother     Heart Disease Maternal Grandfather     Diabetes Maternal Grandfather     Arthritis Paternal Grandmother     Alzheimer Disease Paternal Grandmother     Heart Disease Paternal Grandfather        Prior to Admission medications    Medication Sig Start Date End Date Taking? Authorizing Provider   lisinopril (ZESTRIL) 10 MG tablet Take 1 tablet  "(10 mg) by mouth daily. 11/4/24  Yes Wilber Dupree MD       No Known Allergies     REVIEW OF SYSTEMS:   5 point ROS negative except as noted above in HPI, including Gen., Resp., CV, GI &  system review.    PHYSICAL EXAM:   /81   Temp 98  F (36.7  C)   Resp 18   Ht 1.778 m (5' 10\")   Wt 87.1 kg (192 lb)   SpO2 96%   BMI 27.55 kg/m   Estimated body mass index is 27.55 kg/m  as calculated from the following:    Height as of this encounter: 1.778 m (5' 10\").    Weight as of this encounter: 87.1 kg (192 lb).   Constitutional: Awake, alert, no acute distress.  Eyes: No scleral icterus.  Conjunctiva are without injection.  ENMT: Mucous membranes moist, dentition and gums are intact.   Neck: Soft, supple, trachea midline.    Endocrine: n/a   Lymphatic: There is no cervical, submandibularadenopathy.  Respiratory: normal effortgs   Cardiovascular: S1, S2  Abdomen: Non-distended, non-tender,  No masses,  Musculoskeletal: No spinal or CVA tenderness. Full range of motion in the upper and lower extremities.    Skin: No skin rashes or lesions to inspection.  No petechia.    Neurologic: alerted and oriented 3x  Psychiatric: The patient's affect is not blunted and mood is appropriate.  DIAGNOSTICS:    Not indicated    IMPRESSION   ASA Class 2 - Mild systemic disease    PLAN:   Plan for Colonoscopy with possible biopsy, possible polypectomy. We discussed the risks, benefits and alternatives and the patient wished to proceed.  Patient is cleared for the above procedure.    The above has been forwarded to the consulting provider.    Melvin Kirkpatrick DO  [facility]        "

## 2025-01-30 LAB
PATH REPORT.COMMENTS IMP SPEC: NORMAL
PATH REPORT.FINAL DX SPEC: NORMAL
PATH REPORT.GROSS SPEC: NORMAL
PATH REPORT.MICROSCOPIC SPEC OTHER STN: NORMAL
PATH REPORT.RELEVANT HX SPEC: NORMAL
PHOTO IMAGE: NORMAL

## 2025-04-21 DIAGNOSIS — I10 BENIGN ESSENTIAL HYPERTENSION: ICD-10-CM

## 2025-04-23 RX ORDER — LISINOPRIL 10 MG/1
10 TABLET ORAL DAILY
Qty: 90 TABLET | Refills: 1 | Status: SHIPPED | OUTPATIENT
Start: 2025-04-23

## (undated) DEVICE — ENDO FORCEP ENDOJAW BIOPSY 3.7MMX230CM FB-222U

## (undated) DEVICE — ENDO SNARE EXACTO COLD 9MM LOOP 2.4MMX230CM 00711115

## (undated) RX ORDER — METHYLPREDNISOLONE ACETATE 40 MG/ML
INJECTION, SUSPENSION INTRA-ARTICULAR; INTRALESIONAL; INTRAMUSCULAR; SOFT TISSUE
Status: DISPENSED
Start: 2018-11-01

## (undated) RX ORDER — LIDOCAINE HYDROCHLORIDE 10 MG/ML
INJECTION, SOLUTION EPIDURAL; INFILTRATION; INTRACAUDAL; PERINEURAL
Status: DISPENSED
Start: 2025-01-29

## (undated) RX ORDER — DEXAMETHASONE SODIUM PHOSPHATE 10 MG/ML
INJECTION, SOLUTION INTRAMUSCULAR; INTRAVENOUS
Status: DISPENSED
Start: 2018-11-01

## (undated) RX ORDER — BUPIVACAINE HYDROCHLORIDE 5 MG/ML
INJECTION, SOLUTION EPIDURAL; INTRACAUDAL
Status: DISPENSED
Start: 2018-11-01